# Patient Record
Sex: MALE | Race: ASIAN | Employment: OTHER | URBAN - METROPOLITAN AREA
[De-identification: names, ages, dates, MRNs, and addresses within clinical notes are randomized per-mention and may not be internally consistent; named-entity substitution may affect disease eponyms.]

---

## 2017-01-01 ENCOUNTER — APPOINTMENT (OUTPATIENT)
Dept: CT IMAGING | Facility: HOSPITAL | Age: 67
DRG: 811 | End: 2017-01-01
Attending: INTERNAL MEDICINE
Payer: MEDICARE

## 2017-01-01 ENCOUNTER — APPOINTMENT (OUTPATIENT)
Dept: GENERAL RADIOLOGY | Facility: HOSPITAL | Age: 67
DRG: 258 | End: 2017-01-01
Attending: INTERNAL MEDICINE
Payer: MEDICARE

## 2017-01-01 ENCOUNTER — TELEPHONE (OUTPATIENT)
Dept: MEDSURG UNIT | Facility: HOSPITAL | Age: 67
End: 2017-01-01

## 2017-01-01 ENCOUNTER — APPOINTMENT (OUTPATIENT)
Dept: GENERAL RADIOLOGY | Facility: HOSPITAL | Age: 67
DRG: 811 | End: 2017-01-01
Attending: INTERNAL MEDICINE
Payer: MEDICARE

## 2017-01-01 ENCOUNTER — SURGERY (OUTPATIENT)
Age: 67
End: 2017-01-01

## 2017-01-01 ENCOUNTER — APPOINTMENT (OUTPATIENT)
Dept: GENERAL RADIOLOGY | Facility: HOSPITAL | Age: 67
DRG: 811 | End: 2017-01-01
Attending: CLINICAL NURSE SPECIALIST
Payer: MEDICARE

## 2017-01-01 ENCOUNTER — APPOINTMENT (OUTPATIENT)
Dept: GENERAL RADIOLOGY | Facility: HOSPITAL | Age: 67
DRG: 258 | End: 2017-01-01
Attending: EMERGENCY MEDICINE
Payer: MEDICARE

## 2017-01-01 ENCOUNTER — HOSPITAL ENCOUNTER (EMERGENCY)
Facility: HOSPITAL | Age: 67
Discharge: OTHER INSTITUTION | End: 2017-01-01
Attending: EMERGENCY MEDICINE
Payer: MEDICARE

## 2017-01-01 ENCOUNTER — APPOINTMENT (OUTPATIENT)
Dept: CV DIAGNOSTICS | Facility: HOSPITAL | Age: 67
DRG: 258 | End: 2017-01-01
Attending: INTERNAL MEDICINE
Payer: MEDICARE

## 2017-01-01 ENCOUNTER — APPOINTMENT (OUTPATIENT)
Dept: INTERVENTIONAL RADIOLOGY/VASCULAR | Facility: HOSPITAL | Age: 67
DRG: 258 | End: 2017-01-01
Attending: INTERNAL MEDICINE
Payer: MEDICARE

## 2017-01-01 ENCOUNTER — TELEPHONE (OUTPATIENT)
Dept: PALLIATIVE CARE | Facility: HOSPITAL | Age: 67
End: 2017-01-01

## 2017-01-01 ENCOUNTER — HOSPITAL ENCOUNTER (INPATIENT)
Facility: HOSPITAL | Age: 67
LOS: 5 days | Discharge: NURSING, REHAB OR LONG-TERM CARE | DRG: 258 | End: 2017-01-01
Attending: EMERGENCY MEDICINE | Admitting: INTERNAL MEDICINE
Payer: MEDICARE

## 2017-01-01 ENCOUNTER — HOSPITAL ENCOUNTER (INPATIENT)
Facility: HOSPITAL | Age: 67
LOS: 15 days | Discharge: NURSING, REHAB OR LONG-TERM CARE | DRG: 811 | End: 2017-01-01
Attending: EMERGENCY MEDICINE | Admitting: INTERNAL MEDICINE
Payer: MEDICARE

## 2017-01-01 ENCOUNTER — APPOINTMENT (OUTPATIENT)
Dept: GENERAL RADIOLOGY | Facility: HOSPITAL | Age: 67
End: 2017-01-01
Attending: EMERGENCY MEDICINE
Payer: MEDICARE

## 2017-01-01 VITALS
HEART RATE: 69 BPM | TEMPERATURE: 98 F | BODY MASS INDEX: 20.84 KG/M2 | DIASTOLIC BLOOD PRESSURE: 64 MMHG | HEIGHT: 66 IN | SYSTOLIC BLOOD PRESSURE: 130 MMHG | OXYGEN SATURATION: 99 % | RESPIRATION RATE: 14 BRPM | WEIGHT: 129.69 LBS

## 2017-01-01 VITALS
HEART RATE: 92 BPM | OXYGEN SATURATION: 100 % | SYSTOLIC BLOOD PRESSURE: 95 MMHG | HEIGHT: 65 IN | WEIGHT: 128.5 LBS | TEMPERATURE: 100 F | BODY MASS INDEX: 21.41 KG/M2 | RESPIRATION RATE: 22 BRPM | DIASTOLIC BLOOD PRESSURE: 63 MMHG

## 2017-01-01 VITALS
HEART RATE: 82 BPM | DIASTOLIC BLOOD PRESSURE: 64 MMHG | SYSTOLIC BLOOD PRESSURE: 96 MMHG | OXYGEN SATURATION: 100 % | RESPIRATION RATE: 28 BRPM | TEMPERATURE: 98 F

## 2017-01-01 DIAGNOSIS — R78.81 POSITIVE BLOOD CULTURE: ICD-10-CM

## 2017-01-01 DIAGNOSIS — J96.10 CHRONIC RESPIRATORY FAILURE, UNSPECIFIED WHETHER WITH HYPOXIA OR HYPERCAPNIA (HCC): Primary | ICD-10-CM

## 2017-01-01 DIAGNOSIS — J81.0 ACUTE PULMONARY EDEMA (HCC): Primary | ICD-10-CM

## 2017-01-01 DIAGNOSIS — I69.30 HISTORY OF CVA WITH RESIDUAL DEFICIT: ICD-10-CM

## 2017-01-01 DIAGNOSIS — J18.9 NOSOCOMIAL PNEUMONIA: ICD-10-CM

## 2017-01-01 DIAGNOSIS — D64.9 ANEMIA, UNSPECIFIED TYPE: Primary | ICD-10-CM

## 2017-01-01 DIAGNOSIS — Z99.11 VENTILATOR DEPENDENT (HCC): ICD-10-CM
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
## 2017-01-01 LAB
ALBUMIN SERPL BCP-MCNC: 1.6 G/DL (ref 3.5–4.8)
ALBUMIN/GLOB SERPL: 0.3 {RATIO} (ref 1–2)
ALP SERPL-CCNC: 90 U/L (ref 32–100)
ALT SERPL-CCNC: 33 U/L (ref 17–63)
ANION GAP SERPL CALC-SCNC: 10 MMOL/L (ref 0–18)
ANION GAP SERPL CALC-SCNC: 10 MMOL/L (ref 0–18)
ANION GAP SERPL CALC-SCNC: 4 MMOL/L (ref 0–18)
ANION GAP SERPL CALC-SCNC: 7 MMOL/L (ref 0–18)
ANION GAP SERPL CALC-SCNC: 8 MMOL/L (ref 0–18)
ANION GAP SERPL CALC-SCNC: 9 MMOL/L (ref 0–18)
ANTIBODY SCREEN: NEGATIVE
AST SERPL-CCNC: 26 U/L (ref 15–41)
BASOPHILS # BLD: 0 K/UL (ref 0–0.2)
BASOPHILS # BLD: 0.1 K/UL (ref 0–0.2)
BASOPHILS # BLD: 0.1 K/UL (ref 0–0.2)
BASOPHILS NFR BLD: 0 %
BASOPHILS NFR BLD: 1 %
BASOPHILS NFR BLD: 1 %
BILIRUB SERPL-MCNC: 0.8 MG/DL (ref 0.3–1.2)
BILIRUB UR QL: NEGATIVE
BLOOD TYPE BARCODE: 7300
BUN SERPL-MCNC: 18 MG/DL (ref 8–20)
BUN SERPL-MCNC: 21 MG/DL (ref 8–20)
BUN SERPL-MCNC: 24 MG/DL (ref 8–20)
BUN SERPL-MCNC: 29 MG/DL (ref 8–20)
BUN SERPL-MCNC: 39 MG/DL (ref 8–20)
BUN SERPL-MCNC: 44 MG/DL (ref 8–20)
BUN/CREAT SERPL: 13.7 (ref 10–20)
BUN/CREAT SERPL: 14.4 (ref 10–20)
BUN/CREAT SERPL: 19 (ref 10–20)
BUN/CREAT SERPL: 19.1 (ref 10–20)
BUN/CREAT SERPL: 23.8 (ref 10–20)
BUN/CREAT SERPL: 28.4 (ref 10–20)
CALCIUM SERPL-MCNC: 7.5 MG/DL (ref 8.5–10.5)
CALCIUM SERPL-MCNC: 7.9 MG/DL (ref 8.5–10.5)
CALCIUM SERPL-MCNC: 8.1 MG/DL (ref 8.5–10.5)
CALCIUM SERPL-MCNC: 8.3 MG/DL (ref 8.5–10.5)
CHLORIDE SERPL-SCNC: 110 MMOL/L (ref 95–110)
CHLORIDE SERPL-SCNC: 110 MMOL/L (ref 95–110)
CHLORIDE SERPL-SCNC: 111 MMOL/L (ref 95–110)
CHLORIDE SERPL-SCNC: 113 MMOL/L (ref 95–110)
CHLORIDE SERPL-SCNC: 113 MMOL/L (ref 95–110)
CHLORIDE SERPL-SCNC: 115 MMOL/L (ref 95–110)
CHOLEST SERPL-MCNC: 65 MG/DL (ref 110–200)
CO2 SERPL-SCNC: 18 MMOL/L (ref 22–32)
CO2 SERPL-SCNC: 18 MMOL/L (ref 22–32)
CO2 SERPL-SCNC: 19 MMOL/L (ref 22–32)
CO2 SERPL-SCNC: 20 MMOL/L (ref 22–32)
CO2 SERPL-SCNC: 21 MMOL/L (ref 22–32)
CO2 SERPL-SCNC: 24 MMOL/L (ref 22–32)
COLOR UR: YELLOW
CREAT SERPL-MCNC: 1.25 MG/DL (ref 0.5–1.5)
CREAT SERPL-MCNC: 1.26 MG/DL (ref 0.5–1.5)
CREAT SERPL-MCNC: 1.52 MG/DL (ref 0.5–1.5)
CREAT SERPL-MCNC: 1.53 MG/DL (ref 0.5–1.5)
CREAT SERPL-MCNC: 1.55 MG/DL (ref 0.5–1.5)
CREAT SERPL-MCNC: 1.64 MG/DL (ref 0.5–1.5)
EOSINOPHIL # BLD: 0.2 K/UL (ref 0–0.7)
EOSINOPHIL # BLD: 1.2 K/UL (ref 0–0.7)
EOSINOPHIL # BLD: 1.3 K/UL (ref 0–0.7)
EOSINOPHIL # BLD: 1.5 K/UL (ref 0–0.7)
EOSINOPHIL # BLD: 1.7 K/UL (ref 0–0.7)
EOSINOPHIL NFR BLD: 10 %
EOSINOPHIL NFR BLD: 10 %
EOSINOPHIL NFR BLD: 2 %
EOSINOPHIL NFR BLD: 9 %
EOSINOPHIL NFR BLD: 9 %
ERYTHROCYTE [DISTWIDTH] IN BLOOD BY AUTOMATED COUNT: 19.9 % (ref 11–15)
ERYTHROCYTE [DISTWIDTH] IN BLOOD BY AUTOMATED COUNT: 20.1 % (ref 11–15)
ERYTHROCYTE [DISTWIDTH] IN BLOOD BY AUTOMATED COUNT: 20.2 % (ref 11–15)
ERYTHROCYTE [DISTWIDTH] IN BLOOD BY AUTOMATED COUNT: 20.5 % (ref 11–15)
ERYTHROCYTE [DISTWIDTH] IN BLOOD BY AUTOMATED COUNT: 20.7 % (ref 11–15)
GLOBULIN PLAS-MCNC: 5 G/DL (ref 2.5–3.7)
GLUCOSE BLDC GLUCOMTR-MCNC: 106 MG/DL (ref 70–99)
GLUCOSE BLDC GLUCOMTR-MCNC: 120 MG/DL (ref 70–99)
GLUCOSE BLDC GLUCOMTR-MCNC: 122 MG/DL (ref 70–99)
GLUCOSE BLDC GLUCOMTR-MCNC: 133 MG/DL (ref 70–99)
GLUCOSE BLDC GLUCOMTR-MCNC: 133 MG/DL (ref 70–99)
GLUCOSE BLDC GLUCOMTR-MCNC: 149 MG/DL (ref 70–99)
GLUCOSE BLDC GLUCOMTR-MCNC: 152 MG/DL (ref 70–99)
GLUCOSE BLDC GLUCOMTR-MCNC: 156 MG/DL (ref 70–99)
GLUCOSE BLDC GLUCOMTR-MCNC: 160 MG/DL (ref 70–99)
GLUCOSE BLDC GLUCOMTR-MCNC: 168 MG/DL (ref 70–99)
GLUCOSE BLDC GLUCOMTR-MCNC: 168 MG/DL (ref 70–99)
GLUCOSE BLDC GLUCOMTR-MCNC: 169 MG/DL (ref 70–99)
GLUCOSE BLDC GLUCOMTR-MCNC: 169 MG/DL (ref 70–99)
GLUCOSE BLDC GLUCOMTR-MCNC: 171 MG/DL (ref 70–99)
GLUCOSE BLDC GLUCOMTR-MCNC: 177 MG/DL (ref 70–99)
GLUCOSE BLDC GLUCOMTR-MCNC: 179 MG/DL (ref 70–99)
GLUCOSE BLDC GLUCOMTR-MCNC: 184 MG/DL (ref 70–99)
GLUCOSE BLDC GLUCOMTR-MCNC: 184 MG/DL (ref 70–99)
GLUCOSE BLDC GLUCOMTR-MCNC: 190 MG/DL (ref 70–99)
GLUCOSE BLDC GLUCOMTR-MCNC: 195 MG/DL (ref 70–99)
GLUCOSE BLDC GLUCOMTR-MCNC: 200 MG/DL (ref 70–99)
GLUCOSE BLDC GLUCOMTR-MCNC: 203 MG/DL (ref 70–99)
GLUCOSE BLDC GLUCOMTR-MCNC: 205 MG/DL (ref 70–99)
GLUCOSE BLDC GLUCOMTR-MCNC: 209 MG/DL (ref 70–99)
GLUCOSE BLDC GLUCOMTR-MCNC: 228 MG/DL (ref 70–99)
GLUCOSE BLDC GLUCOMTR-MCNC: 230 MG/DL (ref 70–99)
GLUCOSE BLDC GLUCOMTR-MCNC: 247 MG/DL (ref 70–99)
GLUCOSE BLDC GLUCOMTR-MCNC: 250 MG/DL (ref 70–99)
GLUCOSE BLDC GLUCOMTR-MCNC: 251 MG/DL (ref 70–99)
GLUCOSE BLDC GLUCOMTR-MCNC: 257 MG/DL (ref 70–99)
GLUCOSE BLDC GLUCOMTR-MCNC: 300 MG/DL (ref 70–99)
GLUCOSE BLDC GLUCOMTR-MCNC: 301 MG/DL (ref 70–99)
GLUCOSE BLDC GLUCOMTR-MCNC: 322 MG/DL (ref 70–99)
GLUCOSE BLDC GLUCOMTR-MCNC: 336 MG/DL (ref 70–99)
GLUCOSE BLDC GLUCOMTR-MCNC: 344 MG/DL (ref 70–99)
GLUCOSE BLDC GLUCOMTR-MCNC: 359 MG/DL (ref 70–99)
GLUCOSE BLDC GLUCOMTR-MCNC: 363 MG/DL (ref 70–99)
GLUCOSE BLDC GLUCOMTR-MCNC: 443 MG/DL (ref 70–99)
GLUCOSE BLDC GLUCOMTR-MCNC: 472 MG/DL (ref 70–99)
GLUCOSE BLDC GLUCOMTR-MCNC: 478 MG/DL (ref 70–99)
GLUCOSE BLDC GLUCOMTR-MCNC: 48 MG/DL (ref 70–99)
GLUCOSE BLDC GLUCOMTR-MCNC: 73 MG/DL (ref 70–99)
GLUCOSE BLDC GLUCOMTR-MCNC: 95 MG/DL (ref 70–99)
GLUCOSE SERPL-MCNC: 126 MG/DL (ref 70–99)
GLUCOSE SERPL-MCNC: 175 MG/DL (ref 70–99)
GLUCOSE SERPL-MCNC: 195 MG/DL (ref 70–99)
GLUCOSE SERPL-MCNC: 208 MG/DL (ref 70–99)
GLUCOSE SERPL-MCNC: 214 MG/DL (ref 70–99)
GLUCOSE SERPL-MCNC: 239 MG/DL (ref 70–99)
HBA1C MFR BLD: 6.3 % (ref 4–6)
HCT VFR BLD AUTO: 20.7 % (ref 41–52)
HCT VFR BLD AUTO: 24.2 % (ref 41–52)
HCT VFR BLD AUTO: 25 % (ref 41–52)
HCT VFR BLD AUTO: 27.6 % (ref 41–52)
HCT VFR BLD AUTO: 28.6 % (ref 41–52)
HDLC SERPL-MCNC: 18 MG/DL
HEMOCCULT STL QL: NEGATIVE
HEMOCCULT STL QL: NEGATIVE
HGB BLD-MCNC: 6.6 G/DL (ref 13.5–17.5)
HGB BLD-MCNC: 7.8 G/DL (ref 13.5–17.5)
HGB BLD-MCNC: 8.1 G/DL (ref 13.5–17.5)
HGB BLD-MCNC: 8.1 G/DL (ref 13.5–17.5)
HGB BLD-MCNC: 8.9 G/DL (ref 13.5–17.5)
HGB BLD-MCNC: 8.9 G/DL (ref 13.5–17.5)
KETONES UR-MCNC: NEGATIVE MG/DL
LACTATE SERPL-SCNC: 2.3 MMOL/L (ref 0.5–2.2)
LDLC SERPL CALC-MCNC: 28 MG/DL (ref 0–99)
LEUKOCYTE ESTERASE UR QL STRIP.AUTO: NEGATIVE
LYMPHOCYTES # BLD: 2.3 K/UL (ref 1–4)
LYMPHOCYTES # BLD: 2.5 K/UL (ref 1–4)
LYMPHOCYTES # BLD: 2.6 K/UL (ref 1–4)
LYMPHOCYTES # BLD: 3 K/UL (ref 1–4)
LYMPHOCYTES # BLD: 3.2 K/UL (ref 1–4)
LYMPHOCYTES NFR BLD: 17 %
LYMPHOCYTES NFR BLD: 18 %
LYMPHOCYTES NFR BLD: 21 %
MAGNESIUM SERPL-MCNC: 1.7 MG/DL (ref 1.8–2.5)
MAGNESIUM SERPL-MCNC: 1.7 MG/DL (ref 1.8–2.5)
MAGNESIUM SERPL-MCNC: 1.9 MG/DL (ref 1.8–2.5)
MAGNESIUM SERPL-MCNC: 2.1 MG/DL (ref 1.8–2.5)
MCH RBC QN AUTO: 24.3 PG (ref 27–32)
MCH RBC QN AUTO: 24.7 PG (ref 27–32)
MCH RBC QN AUTO: 25.9 PG (ref 27–32)
MCH RBC QN AUTO: 26 PG (ref 27–32)
MCH RBC QN AUTO: 26.2 PG (ref 27–32)
MCHC RBC AUTO-ENTMCNC: 31.3 G/DL (ref 32–37)
MCHC RBC AUTO-ENTMCNC: 31.7 G/DL (ref 32–37)
MCHC RBC AUTO-ENTMCNC: 32 G/DL (ref 32–37)
MCHC RBC AUTO-ENTMCNC: 32.3 G/DL (ref 32–37)
MCHC RBC AUTO-ENTMCNC: 32.4 G/DL (ref 32–37)
MCV RBC AUTO: 77.6 FL (ref 80–100)
MCV RBC AUTO: 78.1 FL (ref 80–100)
MCV RBC AUTO: 80.2 FL (ref 80–100)
MCV RBC AUTO: 80.9 FL (ref 80–100)
MCV RBC AUTO: 81.1 FL (ref 80–100)
MONOCYTES # BLD: 0.7 K/UL (ref 0–1)
MONOCYTES # BLD: 0.9 K/UL (ref 0–1)
MONOCYTES # BLD: 1 K/UL (ref 0–1)
MONOCYTES NFR BLD: 5 %
MONOCYTES NFR BLD: 6 %
MONOCYTES NFR BLD: 6 %
MONOCYTES NFR BLD: 7 %
MONOCYTES NFR BLD: 7 %
MRSA DNA SPEC QL NAA+PROBE: NEGATIVE
NEUTROPHILS # BLD AUTO: 10.8 K/UL (ref 1.8–7.7)
NEUTROPHILS # BLD AUTO: 11.7 K/UL (ref 1.8–7.7)
NEUTROPHILS # BLD AUTO: 8.5 K/UL (ref 1.8–7.7)
NEUTROPHILS # BLD AUTO: 9 K/UL (ref 1.8–7.7)
NEUTROPHILS # BLD AUTO: 9.4 K/UL (ref 1.8–7.7)
NEUTROPHILS NFR BLD: 66 %
NEUTROPHILS NFR BLD: 66 %
NEUTROPHILS NFR BLD: 67 %
NEUTROPHILS NFR BLD: 68 %
NEUTROPHILS NFR BLD: 71 %
NITRITE UR QL STRIP.AUTO: NEGATIVE
NONHDLC SERPL-MCNC: 47 MG/DL
OSMOLALITY UR CALC.SUM OF ELEC: 291 MOSM/KG (ref 275–295)
OSMOLALITY UR CALC.SUM OF ELEC: 299 MOSM/KG (ref 275–295)
OSMOLALITY UR CALC.SUM OF ELEC: 300 MOSM/KG (ref 275–295)
OSMOLALITY UR CALC.SUM OF ELEC: 301 MOSM/KG (ref 275–295)
OSMOLALITY UR CALC.SUM OF ELEC: 307 MOSM/KG (ref 275–295)
OSMOLALITY UR CALC.SUM OF ELEC: 307 MOSM/KG (ref 275–295)
PH UR: 7 [PH] (ref 5–8)
PHOSPHATE SERPL-MCNC: 3.4 MG/DL (ref 2.4–4.7)
PLATELET # BLD AUTO: 132 K/UL (ref 140–400)
PLATELET # BLD AUTO: 139 K/UL (ref 140–400)
PLATELET # BLD AUTO: 148 K/UL (ref 140–400)
PLATELET # BLD AUTO: 189 K/UL (ref 140–400)
PLATELET # BLD AUTO: 204 K/UL (ref 140–400)
PMV BLD AUTO: 8.4 FL (ref 7.4–10.3)
PMV BLD AUTO: 8.8 FL (ref 7.4–10.3)
PMV BLD AUTO: 9 FL (ref 7.4–10.3)
PMV BLD AUTO: 9.1 FL (ref 7.4–10.3)
PMV BLD AUTO: 9.1 FL (ref 7.4–10.3)
POTASSIUM SERPL-SCNC: 3.1 MMOL/L (ref 3.3–5.1)
POTASSIUM SERPL-SCNC: 3.2 MMOL/L (ref 3.3–5.1)
POTASSIUM SERPL-SCNC: 3.5 MMOL/L (ref 3.3–5.1)
POTASSIUM SERPL-SCNC: 3.5 MMOL/L (ref 3.3–5.1)
POTASSIUM SERPL-SCNC: 3.6 MMOL/L (ref 3.3–5.1)
POTASSIUM SERPL-SCNC: 3.6 MMOL/L (ref 3.3–5.1)
POTASSIUM SERPL-SCNC: 3.8 MMOL/L (ref 3.3–5.1)
POTASSIUM SERPL-SCNC: 3.9 MMOL/L (ref 3.3–5.1)
POTASSIUM SERPL-SCNC: 4 MMOL/L (ref 3.3–5.1)
PROT SERPL-MCNC: 6.6 G/DL (ref 5.9–8.4)
PROT UR-MCNC: 100 MG/DL
RBC # BLD AUTO: 2.65 M/UL (ref 4.5–5.9)
RBC # BLD AUTO: 3.01 M/UL (ref 4.5–5.9)
RBC # BLD AUTO: 3.09 M/UL (ref 4.5–5.9)
RBC # BLD AUTO: 3.4 M/UL (ref 4.5–5.9)
RBC # BLD AUTO: 3.68 M/UL (ref 4.5–5.9)
RBC #/AREA URNS AUTO: 5 /HPF
RBC #/AREA URNS AUTO: 7 /HPF
RH BLOOD TYPE: POSITIVE
SODIUM SERPL-SCNC: 137 MMOL/L (ref 136–144)
SODIUM SERPL-SCNC: 139 MMOL/L (ref 136–144)
SODIUM SERPL-SCNC: 140 MMOL/L (ref 136–144)
SODIUM SERPL-SCNC: 141 MMOL/L (ref 136–144)
SODIUM SERPL-SCNC: 141 MMOL/L (ref 136–144)
SODIUM SERPL-SCNC: 142 MMOL/L (ref 136–144)
SP GR UR STRIP: 1.01 (ref 1–1.03)
TRIGL SERPL-MCNC: 93 MG/DL (ref 1–149)
TROPONIN I SERPL-MCNC: 0.12 NG/ML (ref ?–0.03)
UROBILINOGEN UR STRIP-ACNC: <2
VANCOMYCIN SERPL-MCNC: 24.4 MCG/ML
VANCOMYCIN TROUGH SERPL-MCNC: 31.1 MCG/ML (ref 10–20)
VANCOMYCIN TROUGH SERPL-MCNC: 36.6 MCG/ML (ref 10–20)
VIT C UR-MCNC: 20 MG/DL
WBC # BLD AUTO: 12.7 K/UL (ref 4–11)
WBC # BLD AUTO: 12.8 K/UL (ref 4–11)
WBC # BLD AUTO: 14.1 K/UL (ref 4–11)
WBC # BLD AUTO: 16.4 K/UL (ref 4–11)
WBC # BLD AUTO: 17.4 K/UL (ref 4–11)
WBC #/AREA URNS AUTO: 1 /HPF
WBC #/AREA URNS AUTO: 4 /HPF

## 2017-01-01 PROCEDURE — 71010 XR CHEST AP PORTABLE  (CPT=71010): CPT | Performed by: EMERGENCY MEDICINE

## 2017-01-01 PROCEDURE — 99233 SBSQ HOSP IP/OBS HIGH 50: CPT | Performed by: INTERNAL MEDICINE

## 2017-01-01 PROCEDURE — 96361 HYDRATE IV INFUSION ADD-ON: CPT

## 2017-01-01 PROCEDURE — 99291 CRITICAL CARE FIRST HOUR: CPT

## 2017-01-01 PROCEDURE — 71010 XR CHEST AP PORTABLE  (CPT=71010): CPT | Performed by: CLINICAL NURSE SPECIALIST

## 2017-01-01 PROCEDURE — 0JH606Z INSERTION OF PACEMAKER, DUAL CHAMBER INTO CHEST SUBCUTANEOUS TISSUE AND FASCIA, OPEN APPROACH: ICD-10-PCS | Performed by: INTERNAL MEDICINE

## 2017-01-01 PROCEDURE — 74000 XR ABDOMEN (1 VIEW) (CPT=74000): CPT | Performed by: INTERNAL MEDICINE

## 2017-01-01 PROCEDURE — 83605 ASSAY OF LACTIC ACID: CPT | Performed by: EMERGENCY MEDICINE

## 2017-01-01 PROCEDURE — 71010 XR CHEST AP PORTABLE  (CPT=71010): CPT | Performed by: INTERNAL MEDICINE

## 2017-01-01 PROCEDURE — 5A1955Z RESPIRATORY VENTILATION, GREATER THAN 96 CONSECUTIVE HOURS: ICD-10-PCS | Performed by: INTERNAL MEDICINE

## 2017-01-01 PROCEDURE — 74176 CT ABD & PELVIS W/O CONTRAST: CPT | Performed by: INTERNAL MEDICINE

## 2017-01-01 PROCEDURE — 99291 CRITICAL CARE FIRST HOUR: CPT | Performed by: INTERNAL MEDICINE

## 2017-01-01 PROCEDURE — 94002 VENT MGMT INPAT INIT DAY: CPT

## 2017-01-01 PROCEDURE — 99232 SBSQ HOSP IP/OBS MODERATE 35: CPT | Performed by: NURSE PRACTITIONER

## 2017-01-01 PROCEDURE — 85025 COMPLETE CBC W/AUTO DIFF WBC: CPT | Performed by: EMERGENCY MEDICINE

## 2017-01-01 PROCEDURE — 0DJ08ZZ INSPECTION OF UPPER INTESTINAL TRACT, VIA NATURAL OR ARTIFICIAL OPENING ENDOSCOPIC: ICD-10-PCS | Performed by: INTERNAL MEDICINE

## 2017-01-01 PROCEDURE — 93325 DOPPLER ECHO COLOR FLOW MAPG: CPT | Performed by: INTERNAL MEDICINE

## 2017-01-01 PROCEDURE — 30233N1 TRANSFUSION OF NONAUTOLOGOUS RED BLOOD CELLS INTO PERIPHERAL VEIN, PERCUTANEOUS APPROACH: ICD-10-PCS | Performed by: INTERNAL MEDICINE

## 2017-01-01 PROCEDURE — 93320 DOPPLER ECHO COMPLETE: CPT | Performed by: INTERNAL MEDICINE

## 2017-01-01 PROCEDURE — 96374 THER/PROPH/DIAG INJ IV PUSH: CPT

## 2017-01-01 PROCEDURE — 99223 1ST HOSP IP/OBS HIGH 75: CPT | Performed by: INTERNAL MEDICINE

## 2017-01-01 PROCEDURE — 93010 ELECTROCARDIOGRAM REPORT: CPT | Performed by: EMERGENCY MEDICINE

## 2017-01-01 PROCEDURE — 93306 TTE W/DOPPLER COMPLETE: CPT | Performed by: INTERNAL MEDICINE

## 2017-01-01 PROCEDURE — 99222 1ST HOSP IP/OBS MODERATE 55: CPT | Performed by: NURSE PRACTITIONER

## 2017-01-01 PROCEDURE — 99221 1ST HOSP IP/OBS SF/LOW 40: CPT | Performed by: REGISTERED NURSE

## 2017-01-01 PROCEDURE — 0JPT0PZ REMOVAL OF CARDIAC RHYTHM RELATED DEVICE FROM TRUNK SUBCUTANEOUS TISSUE AND FASCIA, OPEN APPROACH: ICD-10-PCS | Performed by: INTERNAL MEDICINE

## 2017-01-01 PROCEDURE — 93005 ELECTROCARDIOGRAM TRACING: CPT

## 2017-01-01 PROCEDURE — 99222 1ST HOSP IP/OBS MODERATE 55: CPT | Performed by: INTERNAL MEDICINE

## 2017-01-01 RX ORDER — FERROUS SULFATE 300 MG/5ML
220 LIQUID (ML) ORAL DAILY
COMMUNITY

## 2017-01-01 RX ORDER — LORAZEPAM 1 MG/1
1 TABLET ORAL EVERY 6 HOURS PRN
Status: DISCONTINUED | OUTPATIENT
Start: 2017-01-01 | End: 2017-01-01

## 2017-01-01 RX ORDER — DEXTROSE AND SODIUM CHLORIDE 5; .45 G/100ML; G/100ML
INJECTION, SOLUTION INTRAVENOUS
Status: DISPENSED
Start: 2017-01-01 | End: 2017-01-01

## 2017-01-01 RX ORDER — IPRATROPIUM BROMIDE AND ALBUTEROL SULFATE 2.5; .5 MG/3ML; MG/3ML
3 SOLUTION RESPIRATORY (INHALATION)
Status: DISCONTINUED | OUTPATIENT
Start: 2017-01-01 | End: 2017-01-01

## 2017-01-01 RX ORDER — DEXTROSE MONOHYDRATE 100 MG/ML
INJECTION, SOLUTION INTRAVENOUS
Status: COMPLETED
Start: 2017-01-01 | End: 2017-01-01

## 2017-01-01 RX ORDER — SODIUM CHLORIDE 9 MG/ML
INJECTION, SOLUTION INTRAVENOUS
Status: COMPLETED
Start: 2017-01-01 | End: 2017-01-01

## 2017-01-01 RX ORDER — DEXTROSE MONOHYDRATE 25 G/50ML
50 INJECTION, SOLUTION INTRAVENOUS AS NEEDED
Status: DISCONTINUED | OUTPATIENT
Start: 2017-01-01 | End: 2017-01-01

## 2017-01-01 RX ORDER — SODIUM CHLORIDE 9 MG/ML
INJECTION, SOLUTION INTRAVENOUS CONTINUOUS
Status: DISCONTINUED | OUTPATIENT
Start: 2017-01-01 | End: 2017-01-01

## 2017-01-01 RX ORDER — METOPROLOL TARTRATE 5 MG/5ML
5 INJECTION INTRAVENOUS EVERY 6 HOURS PRN
Status: DISCONTINUED | OUTPATIENT
Start: 2017-01-01 | End: 2017-01-01

## 2017-01-01 RX ORDER — BISACODYL 10 MG
10 SUPPOSITORY, RECTAL RECTAL DAILY
COMMUNITY

## 2017-01-01 RX ORDER — ACETAMINOPHEN 325 MG/1
650 TABLET ORAL ONCE
Status: COMPLETED | OUTPATIENT
Start: 2017-01-01 | End: 2017-01-01

## 2017-01-01 RX ORDER — NITROGLYCERIN 0.4 MG/1
0.4 TABLET SUBLINGUAL EVERY 5 MIN PRN
Status: DISCONTINUED | OUTPATIENT
Start: 2017-01-01 | End: 2017-01-01

## 2017-01-01 RX ORDER — LORAZEPAM 2 MG/ML
2 INJECTION INTRAMUSCULAR ONCE
Status: COMPLETED | OUTPATIENT
Start: 2017-01-01 | End: 2017-01-01

## 2017-01-01 RX ORDER — DEXTROSE AND SODIUM CHLORIDE 5; .9 G/100ML; G/100ML
INJECTION, SOLUTION INTRAVENOUS CONTINUOUS
Status: DISCONTINUED | OUTPATIENT
Start: 2017-01-01 | End: 2017-01-01

## 2017-01-01 RX ORDER — BISACODYL 10 MG
10 SUPPOSITORY, RECTAL RECTAL
Status: DISCONTINUED | OUTPATIENT
Start: 2017-01-01 | End: 2017-01-01

## 2017-01-01 RX ORDER — 0.9 % SODIUM CHLORIDE 0.9 %
VIAL (ML) INJECTION
Status: COMPLETED
Start: 2017-01-01 | End: 2017-01-01

## 2017-01-01 RX ORDER — CARVEDILOL 3.12 MG/1
3.12 TABLET ORAL 2 TIMES DAILY WITH MEALS
Qty: 120 TABLET | Refills: 0 | Status: SHIPPED | OUTPATIENT
Start: 2017-01-01 | End: 2017-01-01

## 2017-01-01 RX ORDER — LISINOPRIL 2.5 MG/1
2.5 TABLET ORAL DAILY
COMMUNITY

## 2017-01-01 RX ORDER — 0.9 % SODIUM CHLORIDE 0.9 %
VIAL (ML) INJECTION
Status: DISCONTINUED
Start: 2017-01-01 | End: 2017-01-01

## 2017-01-01 RX ORDER — SODIUM CHLORIDE 9 MG/ML
INJECTION, SOLUTION INTRAVENOUS ONCE
Status: DISCONTINUED | OUTPATIENT
Start: 2017-01-01 | End: 2017-01-01

## 2017-01-01 RX ORDER — MAGNESIUM OXIDE 400 MG (241.3 MG MAGNESIUM) TABLET
400 TABLET ONCE
Status: COMPLETED | OUTPATIENT
Start: 2017-01-01 | End: 2017-01-01

## 2017-01-01 RX ORDER — TERAZOSIN 5 MG/1
5 CAPSULE ORAL NIGHTLY
Status: DISCONTINUED | OUTPATIENT
Start: 2017-01-01 | End: 2017-01-01

## 2017-01-01 RX ORDER — ONDANSETRON HYDROCHLORIDE 4 MG/5ML
4 SOLUTION ORAL ONCE
Status: DISCONTINUED | OUTPATIENT
Start: 2017-01-01 | End: 2017-01-01 | Stop reason: RX

## 2017-01-01 RX ORDER — ACETAMINOPHEN 325 MG/1
325 TABLET ORAL EVERY 6 HOURS PRN
Status: DISCONTINUED | OUTPATIENT
Start: 2017-01-01 | End: 2017-01-01

## 2017-01-01 RX ORDER — GARLIC EXTRACT 500 MG
1 CAPSULE ORAL 2 TIMES DAILY
COMMUNITY

## 2017-01-01 RX ORDER — CARVEDILOL 3.12 MG/1
3.12 TABLET ORAL 2 TIMES DAILY WITH MEALS
Status: DISCONTINUED | OUTPATIENT
Start: 2017-01-01 | End: 2017-01-01

## 2017-01-01 RX ORDER — METOCLOPRAMIDE 10 MG/1
5 TABLET ORAL
Status: DISCONTINUED | OUTPATIENT
Start: 2017-01-01 | End: 2017-01-01

## 2017-01-01 RX ORDER — BISACODYL 10 MG
10 SUPPOSITORY, RECTAL RECTAL DAILY
Status: DISCONTINUED | OUTPATIENT
Start: 2017-01-01 | End: 2017-01-01

## 2017-01-01 RX ORDER — AMANTADINE HYDROCHLORIDE 50 MG/5ML
50 SOLUTION ORAL EVERY 12 HOURS
COMMUNITY

## 2017-01-01 RX ORDER — METOCLOPRAMIDE HYDROCHLORIDE 5 MG/ML
10 INJECTION INTRAMUSCULAR; INTRAVENOUS EVERY 8 HOURS
Status: DISCONTINUED | OUTPATIENT
Start: 2017-01-01 | End: 2017-01-01

## 2017-01-01 RX ORDER — POTASSIUM CHLORIDE 14.9 MG/ML
20 INJECTION INTRAVENOUS ONCE
Status: DISCONTINUED | OUTPATIENT
Start: 2017-01-01 | End: 2017-01-01

## 2017-01-01 RX ORDER — CARVEDILOL 6.25 MG/1
6.25 TABLET ORAL 2 TIMES DAILY WITH MEALS
COMMUNITY
End: 2017-01-01

## 2017-01-01 RX ORDER — METOCLOPRAMIDE 10 MG/1
10 TABLET ORAL
Status: DISCONTINUED | OUTPATIENT
Start: 2017-01-01 | End: 2017-01-01

## 2017-01-01 RX ORDER — ARIPIPRAZOLE 15 MG/1
40 TABLET ORAL EVERY 4 HOURS
Status: DISCONTINUED | OUTPATIENT
Start: 2017-01-01 | End: 2017-01-01

## 2017-01-01 RX ORDER — METOCLOPRAMIDE 10 MG/1
10 TABLET ORAL
COMMUNITY

## 2017-01-01 RX ORDER — CARVEDILOL 6.25 MG/1
6.25 TABLET ORAL 2 TIMES DAILY WITH MEALS
Status: DISCONTINUED | OUTPATIENT
Start: 2017-01-01 | End: 2017-01-01

## 2017-01-01 RX ORDER — POTASSIUM CHLORIDE 29.8 MG/ML
40 INJECTION INTRAVENOUS ONCE
Status: DISCONTINUED | OUTPATIENT
Start: 2017-01-01 | End: 2017-01-01

## 2017-01-01 RX ORDER — ATORVASTATIN CALCIUM 20 MG/1
20 TABLET, FILM COATED ORAL NIGHTLY
COMMUNITY

## 2017-01-01 RX ORDER — ZINC SULFATE 50(220)MG
220 CAPSULE ORAL DAILY
COMMUNITY

## 2017-01-01 RX ORDER — POTASSIUM CHLORIDE 29.8 MG/ML
40 INJECTION INTRAVENOUS ONCE
Status: COMPLETED | OUTPATIENT
Start: 2017-01-01 | End: 2017-01-01

## 2017-01-01 RX ORDER — DIPHENHYDRAMINE HYDROCHLORIDE 50 MG/ML
25 INJECTION INTRAMUSCULAR; INTRAVENOUS ONCE
Status: COMPLETED | OUTPATIENT
Start: 2017-01-01 | End: 2017-01-01

## 2017-01-01 RX ORDER — POTASSIUM CHLORIDE 14.9 MG/ML
20 INJECTION INTRAVENOUS ONCE
Status: COMPLETED | OUTPATIENT
Start: 2017-01-01 | End: 2017-01-01

## 2017-01-01 RX ORDER — LISINOPRIL 2.5 MG/1
2.5 TABLET ORAL DAILY
Status: DISCONTINUED | OUTPATIENT
Start: 2017-01-01 | End: 2017-01-01

## 2017-01-01 RX ORDER — ONDANSETRON 2 MG/ML
4 INJECTION INTRAMUSCULAR; INTRAVENOUS EVERY 6 HOURS PRN
Status: DISCONTINUED | OUTPATIENT
Start: 2017-01-01 | End: 2017-01-01

## 2017-01-01 RX ORDER — LISINOPRIL 5 MG/1
2.5 TABLET ORAL DAILY
Status: DISCONTINUED | OUTPATIENT
Start: 2017-01-01 | End: 2017-01-01

## 2017-01-01 RX ORDER — POLYETHYLENE GLYCOL 3350 17 G/17G
17 POWDER, FOR SOLUTION ORAL DAILY PRN
Status: DISCONTINUED | OUTPATIENT
Start: 2017-01-01 | End: 2017-01-01

## 2017-01-01 RX ORDER — SODIUM CHLORIDE 0.9 % (FLUSH) 0.9 %
10 SYRINGE (ML) INJECTION AS NEEDED
Status: DISCONTINUED | OUTPATIENT
Start: 2017-01-01 | End: 2017-01-01

## 2017-01-01 RX ORDER — MIDAZOLAM HYDROCHLORIDE 1 MG/ML
2 INJECTION INTRAMUSCULAR; INTRAVENOUS ONCE
Status: COMPLETED | OUTPATIENT
Start: 2017-01-01 | End: 2017-01-01

## 2017-01-01 RX ORDER — SODIUM CHLORIDE 9 MG/ML
INJECTION, SOLUTION INTRAVENOUS ONCE
Status: COMPLETED | OUTPATIENT
Start: 2017-01-01 | End: 2017-01-01

## 2017-01-01 RX ORDER — LORAZEPAM 1 MG/1
1 TABLET ORAL EVERY 6 HOURS PRN
COMMUNITY

## 2017-01-01 RX ORDER — BACITRACIN 50000 [USP'U]/1
INJECTION, POWDER, LYOPHILIZED, FOR SOLUTION INTRAMUSCULAR
Status: COMPLETED
Start: 2017-01-01 | End: 2017-01-01

## 2017-01-01 RX ORDER — FAMOTIDINE 20 MG/1
20 TABLET ORAL 2 TIMES DAILY
COMMUNITY

## 2017-01-01 RX ORDER — ASPIRIN 81 MG/1
81 TABLET, CHEWABLE ORAL DAILY
Status: DISCONTINUED | OUTPATIENT
Start: 2017-01-01 | End: 2017-01-01

## 2017-01-01 RX ORDER — ONDANSETRON HYDROCHLORIDE 4 MG/5ML
4 SOLUTION ORAL ONCE
COMMUNITY

## 2017-01-01 RX ORDER — DEXTROSE, SODIUM CHLORIDE, AND POTASSIUM CHLORIDE 5; .45; .15 G/100ML; G/100ML; G/100ML
INJECTION INTRAVENOUS CONTINUOUS
Status: DISCONTINUED | OUTPATIENT
Start: 2017-01-01 | End: 2017-01-01

## 2017-01-01 RX ORDER — 0.9 % SODIUM CHLORIDE 0.9 %
VIAL (ML) INJECTION
Status: DISPENSED
Start: 2017-01-01 | End: 2017-01-01

## 2017-01-01 RX ORDER — ARIPIPRAZOLE 15 MG/1
40 TABLET ORAL ONCE
Status: COMPLETED | OUTPATIENT
Start: 2017-01-01 | End: 2017-01-01

## 2017-01-01 RX ORDER — AMANTADINE HYDROCHLORIDE 50 MG/5ML
50 SOLUTION ORAL EVERY 12 HOURS
Status: DISCONTINUED | OUTPATIENT
Start: 2017-01-01 | End: 2017-01-01

## 2017-01-01 RX ORDER — CASTOR OIL AND BALSAM, PERU 788; 87 MG/G; MG/G
OINTMENT TOPICAL 2 TIMES DAILY PRN
Status: DISCONTINUED | OUTPATIENT
Start: 2017-01-01 | End: 2017-01-01

## 2017-01-01 RX ORDER — ARIPIPRAZOLE 15 MG/1
40 TABLET ORAL EVERY 4 HOURS
Status: COMPLETED | OUTPATIENT
Start: 2017-01-01 | End: 2017-01-01

## 2017-01-01 RX ORDER — CASTOR OIL AND BALSAM, PERU 788; 87 MG/G; MG/G
OINTMENT TOPICAL AS NEEDED
Status: DISCONTINUED | OUTPATIENT
Start: 2017-01-01 | End: 2017-01-01

## 2017-01-01 RX ORDER — FUROSEMIDE 10 MG/ML
40 INJECTION INTRAMUSCULAR; INTRAVENOUS ONCE
Status: COMPLETED | OUTPATIENT
Start: 2017-01-01 | End: 2017-01-01

## 2017-01-01 RX ORDER — POTASSIUM CHLORIDE 20 MEQ/1
40 TABLET, EXTENDED RELEASE ORAL ONCE
Status: COMPLETED | OUTPATIENT
Start: 2017-01-01 | End: 2017-01-01

## 2017-01-01 RX ORDER — BISACODYL 10 MG
10 SUPPOSITORY, RECTAL RECTAL ONCE
Status: COMPLETED | OUTPATIENT
Start: 2017-01-01 | End: 2017-01-01

## 2017-01-01 RX ORDER — FUROSEMIDE 10 MG/ML
20 INJECTION INTRAMUSCULAR; INTRAVENOUS
Status: DISCONTINUED | OUTPATIENT
Start: 2017-01-01 | End: 2017-01-01

## 2017-01-01 RX ORDER — ACETAMINOPHEN 325 MG/1
325 TABLET ORAL EVERY 6 HOURS PRN
COMMUNITY

## 2017-01-01 RX ORDER — MULTIVITAMIN
5 TABLET ORAL
COMMUNITY

## 2017-01-01 RX ORDER — ASCORBIC ACID 500 MG
500 TABLET ORAL DAILY
COMMUNITY

## 2017-01-01 RX ORDER — LEVOTHYROXINE SODIUM 0.1 MG/1
100 TABLET ORAL
Status: DISCONTINUED | OUTPATIENT
Start: 2017-01-01 | End: 2017-01-01

## 2017-01-01 RX ORDER — MINERAL OIL AND PETROLATUM 150; 830 MG/G; MG/G
OINTMENT OPHTHALMIC 3 TIMES DAILY
Status: DISCONTINUED | OUTPATIENT
Start: 2017-01-01 | End: 2017-01-01

## 2017-01-01 RX ORDER — SODIUM CHLORIDE 9 MG/ML
INJECTION, SOLUTION INTRAVENOUS
Status: DISPENSED
Start: 2017-01-01 | End: 2017-01-01

## 2017-01-01 RX ORDER — IPRATROPIUM BROMIDE AND ALBUTEROL SULFATE 2.5; .5 MG/3ML; MG/3ML
3 SOLUTION RESPIRATORY (INHALATION) EVERY 4 HOURS PRN
Status: DISCONTINUED | OUTPATIENT
Start: 2017-01-01 | End: 2017-01-01

## 2017-01-01 RX ORDER — FAMOTIDINE 20 MG/1
20 TABLET ORAL 2 TIMES DAILY
Status: DISCONTINUED | OUTPATIENT
Start: 2017-01-01 | End: 2017-01-01

## 2017-01-01 RX ORDER — DOXAZOSIN 8 MG/1
10 TABLET ORAL NIGHTLY
COMMUNITY

## 2017-01-01 RX ORDER — HEPARIN SODIUM 5000 [USP'U]/ML
5000 INJECTION, SOLUTION INTRAVENOUS; SUBCUTANEOUS EVERY 8 HOURS SCHEDULED
Status: DISCONTINUED | OUTPATIENT
Start: 2017-01-01 | End: 2017-01-01

## 2017-01-01 RX ORDER — ONDANSETRON 4 MG/1
4 TABLET, ORALLY DISINTEGRATING ORAL ONCE
Status: DISCONTINUED | OUTPATIENT
Start: 2017-01-01 | End: 2017-01-01

## 2017-01-01 RX ORDER — CASTOR OIL AND BALSAM, PERU 788; 87 MG/G; MG/G
OINTMENT TOPICAL 2 TIMES DAILY
Status: DISCONTINUED | OUTPATIENT
Start: 2017-01-01 | End: 2017-01-01

## 2017-01-01 RX ORDER — SENNOSIDES 8.6 MG
8.6 TABLET ORAL 2 TIMES DAILY
COMMUNITY
End: 2017-01-01

## 2017-01-01 RX ORDER — DEXTROSE AND SODIUM CHLORIDE 5; .45 G/100ML; G/100ML
INJECTION, SOLUTION INTRAVENOUS
Status: COMPLETED
Start: 2017-01-01 | End: 2017-01-01

## 2017-01-01 RX ORDER — HEPARIN SODIUM 5000 [USP'U]/ML
5000 INJECTION, SOLUTION INTRAVENOUS; SUBCUTANEOUS EVERY 8 HOURS SCHEDULED
COMMUNITY

## 2017-01-01 RX ORDER — IPRATROPIUM BROMIDE AND ALBUTEROL SULFATE 2.5; .5 MG/3ML; MG/3ML
3 SOLUTION RESPIRATORY (INHALATION) 2 TIMES DAILY
COMMUNITY

## 2017-01-01 RX ORDER — DOCUSATE SODIUM 100 MG/1
100 CAPSULE, LIQUID FILLED ORAL 2 TIMES DAILY
COMMUNITY

## 2017-01-01 RX ORDER — SODIUM PHOSPHATE, DIBASIC AND SODIUM PHOSPHATE, MONOBASIC 7; 19 G/133ML; G/133ML
1 ENEMA RECTAL ONCE AS NEEDED
Status: DISCONTINUED | OUTPATIENT
Start: 2017-01-01 | End: 2017-01-01

## 2017-01-01 RX ORDER — ATORVASTATIN CALCIUM 20 MG/1
20 TABLET, FILM COATED ORAL NIGHTLY
Status: DISCONTINUED | OUTPATIENT
Start: 2017-01-01 | End: 2017-01-01

## 2017-01-01 RX ORDER — DEXTROSE MONOHYDRATE 100 MG/ML
INJECTION, SOLUTION INTRAVENOUS CONTINUOUS
Status: DISCONTINUED | OUTPATIENT
Start: 2017-01-01 | End: 2017-01-01

## 2017-01-01 RX ORDER — FUROSEMIDE 10 MG/ML
10 INJECTION INTRAMUSCULAR; INTRAVENOUS ONCE
Status: COMPLETED | OUTPATIENT
Start: 2017-01-01 | End: 2017-01-01

## 2017-01-01 RX ORDER — MIDAZOLAM HYDROCHLORIDE 1 MG/ML
INJECTION INTRAMUSCULAR; INTRAVENOUS
Status: COMPLETED
Start: 2017-01-01 | End: 2017-01-01

## 2017-01-01 RX ORDER — FUROSEMIDE 10 MG/ML
60 INJECTION INTRAMUSCULAR; INTRAVENOUS
Status: DISCONTINUED | OUTPATIENT
Start: 2017-01-01 | End: 2017-01-01

## 2017-01-01 RX ORDER — LEVOTHYROXINE SODIUM 0.1 MG/1
100 TABLET ORAL
COMMUNITY

## 2017-01-01 RX ORDER — ONDANSETRON 4 MG/1
4 TABLET, FILM COATED ORAL ONCE
Status: DISCONTINUED | OUTPATIENT
Start: 2017-01-01 | End: 2017-01-01

## 2017-01-01 RX ORDER — FUROSEMIDE 10 MG/ML
40 INJECTION INTRAMUSCULAR; INTRAVENOUS
Status: DISCONTINUED | OUTPATIENT
Start: 2017-01-01 | End: 2017-01-01

## 2017-01-01 RX ORDER — LEVOFLOXACIN 500 MG/1
500 TABLET, FILM COATED ORAL DAILY
COMMUNITY
End: 2017-01-01

## 2017-01-01 RX ORDER — MIDAZOLAM HYDROCHLORIDE 1 MG/ML
INJECTION INTRAMUSCULAR; INTRAVENOUS
Status: DISCONTINUED | OUTPATIENT
Start: 2017-01-01 | End: 2017-01-01

## 2017-01-01 RX ORDER — MORPHINE SULFATE 2 MG/ML
2 INJECTION, SOLUTION INTRAMUSCULAR; INTRAVENOUS ONCE
Status: COMPLETED | OUTPATIENT
Start: 2017-01-01 | End: 2017-01-01

## 2017-01-01 RX ORDER — CARVEDILOL 3.12 MG/1
3.12 TABLET ORAL 2 TIMES DAILY WITH MEALS
Qty: 120 TABLET | Refills: 0 | Status: SHIPPED | OUTPATIENT
Start: 2017-01-01

## 2017-01-01 RX ORDER — METOCLOPRAMIDE HYDROCHLORIDE 5 MG/ML
10 INJECTION INTRAMUSCULAR; INTRAVENOUS EVERY 8 HOURS PRN
Status: DISCONTINUED | OUTPATIENT
Start: 2017-01-01 | End: 2017-01-01

## 2017-01-01 RX ORDER — MAGNESIUM SULFATE HEPTAHYDRATE 40 MG/ML
2 INJECTION, SOLUTION INTRAVENOUS ONCE
Status: COMPLETED | OUTPATIENT
Start: 2017-01-01 | End: 2017-01-01

## 2017-09-16 PROBLEM — I69.30 HISTORY OF CVA WITH RESIDUAL DEFICIT: Status: ACTIVE | Noted: 2017-01-01

## 2017-09-16 PROBLEM — J81.0 ACUTE PULMONARY EDEMA (HCC): Status: ACTIVE | Noted: 2017-01-01

## 2017-09-16 PROBLEM — Z99.11 VENTILATOR DEPENDENT (HCC): Status: ACTIVE | Noted: 2017-01-01
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
## 2017-09-16 PROBLEM — J18.9 NOSOCOMIAL PNEUMONIA: Status: ACTIVE | Noted: 2017-01-01

## 2017-09-16 PROBLEM — R78.81 POSITIVE BLOOD CULTURE: Status: ACTIVE | Noted: 2017-01-01

## 2017-09-16 PROBLEM — J96.11 CHRONIC RESPIRATORY FAILURE WITH HYPOXIA (HCC): Status: ACTIVE | Noted: 2017-01-01

## 2017-09-16 NOTE — DIETARY NOTE
ADULT NUTRITION INITIAL ASSESSMENT    Pt is at high nutrition risk. Pt does not meet malnutrition criteria. RECOMMENDATIONS TO MD:  RD ordered TF per protocol. See Nutrition Intervention for specifics.     Recommend additional Basal (Levemir) insulin FOOD/NUTRITION RELATED HISTORY:    Pt receiving TF Glucerna at [facility] facility but specifics not indicated in transfer record. Pt also receiving NPH insulin 32 units q 12 hrs PTA.   Intake Meeting Needs: Yes via TF  Food Allergies: NKFA     MEDICATIONS:

## 2017-09-16 NOTE — ED NOTES
Spoke with RN at [facility] to gather more pt information - pt has been a resident at [facility][1] in [place] since 09/26/17.  He suffered from a CVA on 07/14/17, was inpatient at [facility] for some time then transferred to [facility] before settling in at

## 2017-09-16 NOTE — PROGRESS NOTES
RESPIRATORY THERAPY MECHANICAL VENTILATION PROGRESS NOTE    Ventilator Weaning:  Patient meets criteria for weaning? no Weaning was attempted no   Current Ventilator Data:        Therapist recommendations:   P recommends      09/16/17 0722   Readings   T

## 2017-09-16 NOTE — H&P
[facility]    History and Physical    Koby Bryan Patient Status:  Inpatient    1950 MRN [id number]   Location [facility] 2W/SW Attending Reza Cantu MD   Hosp Day # 0 PCP Dina Byrd MD     Date:  2017  Da (twelve) hours. acetaminophen 325 MG Oral Tab Take 325 mg by mouth every 6 (six) hours as needed for Pain.   levofloxacin 500 MG Oral Tab Take 500 mg by mouth daily. Acidophilus/Pectin Oral Cap Take 1 capsule by mouth 2 (two) times daily.    LORazepam 1 pulse 70, temperature 98 °F (36.7 °C), temperature source Temporal, resp. rate 26, height 5' 6\" (1.676 m), weight 160 lb (72.6 kg), SpO2 100 %. Nursing note and vitals reviewed. Constitutional: He is thin.    Eyes:   Rt eye cataract      Neck:   trach palliative care                  Román Peguero MD  9/16/2017

## 2017-09-16 NOTE — ED PROVIDER NOTES
Patient Seen in: [facility] AND [facility] Emergency Department    History   Patient presents with:  Abnormal Result (metabolic, cardiac)    Stated Complaint: positive blood cultures    HPI    Is a 73-year-old vent dependent male from who arrives from [facility] [place] Result Value    Clarity Urine Hazy (*)     Protein Urine 100  (*)     Glucose Urine Trace (*)     Blood Urine Trace (*)     Ascorbic Acid Urine 20  (*)     RBC URINE 7 (*)     Bacteria Urine Few (*)     All other components within normal limits   BAS VIEW  9/15/2017 2243 hrs. IMPRESSION:    Patchy air space opacities in the lower lungs, left greater than right, suspicious for pneumonia/aspiration in the appropriate clinical setting.   There are also vague opacities in both lung suggesting there may edema ([facility] J81.0 9/16/2017 Unknown

## 2017-09-16 NOTE — PROGRESS NOTES
Night MD - CCU Admission    Impressions:  ---Bacteremia, with 2 of 2 blood cultures sent to [facility] on 9/14 PM reportedly grew Gram positive cocci in clusters (report sent with patient)  ---Had been on ertapenem and levofloxacin at [facility]) for UTI noted was that tip of PICC line appears superimposed over RA, with suggestion to pull back PICC line by ~ 7 cm. Given + blood cultures, may need to consider removing PICC line, vs. pulling it back.   D/W'd nurse who will pass along to AM shift to address t Q D   --Pepcid 20 mg daily  --heparin 5000 Q 8 S/Q  --Dulcolax suppository Q HS NH PRN  --Coreg 6.25 BID  --Colace 100 BID  --triamcinolone ointment to trach site BID  --amantadine syrup 150 mg BID (reportedly for seizure prevention)  --MOM 30 ml PRN daily 10:28 PM   Result Value Ref Range   Glucose 214 (H) 70 - 99 mg/dL   Sodium 140 136 - 144 mmol/L   Potassium 4.0 3.3 - 5.1 mmol/L   Chloride 110 95 - 110 mmol/L   CO2 20 (L) 22 - 32 mmol/L   BUN 24 (H) 8 - 20 mg/dL   Creatinine 1.26 0.50 - 1.50 mg/dL   Calc Collection Time: 09/16/17 12:20 AM   Result Value Ref Range   Lactic Acid 2.3 (H) 0.5 - 2.2 mmol/L   -POCT GLUCOSE   Collection Time: 09/16/17  1:49 AM   Result Value Ref Range   POC Glucose  73 70 - 99

## 2017-09-16 NOTE — CONSULTS
Cardiology Consult Note     PRIMARY CARDIOLOGIST: WALES      CONSULT FOR: HX OF PACER THAT IS NEAR END OF LIFE      HISTORY: 73 Y/O MALE WITH HX OF PACER AND HTN, RECENT HX OF LG CVA AND NOW IS UNRESPONSIVE ON VENT SINCE July.  WENT FOR PACER EVAL TODAY AND P

## 2017-09-16 NOTE — PROGRESS NOTES
[facility] dosing service    Initial Pharmacokinetic Consult for Vancomycin Dosing     Ann-Marie Soni is a 74year old male admitted who is being treated for bacteremia. and UTI  Pharmacy has been asked to dose Vancomycin by Dr. Patel    He has No Kno

## 2017-09-16 NOTE — CONSULTS
INFECTIOUS DISEASE CONSULT NOTE    Honorio Sommer Patient Status:  Inpatient    1950 MRN [id number]   Location [facility] 2W/SW Attending Giovanna Keenan MD   Hosp Day # 0 PCP Rita Mckeon Medications:   •  Amantadine HCl (SYMMETREL) syrup 50 mg, 50 mg, Oral, Q12H  •  acetaminophen (TYLENOL) tab 325 mg, 325 mg, Oral, Q6H PRN  •  aspirin chewable tab 81 mg, 81 mg, Oral, Daily  •  atorvastatin (LIPITOR) tab 20 mg, 20 mg, Oral, Nightly  •  carv PICC     Laboratory Data:    Recent Labs   Lab  09/15/17   2228   RBC  3.68*   HGB  8.9*   HCT  28.6*   MCV  77.6*   MCH  24.3*   MCHC  31.3*   RDW  20.2*   WBC  12.8*   PLT  139*     Recent Labs   Lab  09/15/17   2228   GLU  214*   BUN  24*   CREATSERUM care of this patient. Please do not hesitate to call if you have any questions. I will continue to follow with you and will make further recommendations based on his progress.     Daniel Nagy  9/16/2017

## 2017-09-16 NOTE — PROGRESS NOTES
PICC dual lumen power with clamp on the Rt upper arm evaluated, pulled back and there is now a 7 cm catheter on the exit site as recommended by Radiologist, Portable xray ordered to confirm placement

## 2017-09-16 NOTE — PLAN OF CARE
Problem: ALTERED NUTRIENT INTAKE - ADULT  Goal: Nutrient intake appropriate for improving, restoring or maintaining nutritional needs  INTERVENTIONS:  - Assess nutritional status and recommend course of action  - Monitor labs, and treatment plans  - Recomm

## 2017-09-16 NOTE — H&P
Pulmonary/Critical Care Admission Note    HPI:   Esther Marcos is a 74year old male with Patient presents with:  Abnormal Result (metabolic, cardiac)    Bianka Kearney MD    Pt is a 75 yo with h/o HTN, DM, CKD and large CVA in 7/17 now comatose and 1 mg 10 mg Oral TID AC   Vancomycin HCl (VANCOCIN) 1,000 mg in sodium chloride 0.9 % 250 mL IVPB add-vantage 1,000 mg Intravenous Q12H   meropenem (MERREM) IVPB 500 mg/100 ml in 0.9% NaCl minibag 500 mg Intravenous Q8H   dextrose 10 % infusion  Intravenous C cont vent    CKD  SCR 1.26  Plan follow    HTN  Well controlled    N/V  Nl exam  Plan hold TFs   AXR x 1   Follow    DVT px  Hep SQ              Josef Correa MD  9/16/2017  11:36 AM

## 2017-09-17 NOTE — CONSULTS
[facility]    PATIENT'S NAME: Marta Jimenez   ATTENDING PHYSICIAN: Destiny Vazquez MD   CONSULTING PHYSICIAN: Amanda Ramirez.  Henrry Mcnamara MD   PATIENT ACCOUNT#:   [id number]    LOCATION:  [address] #:   [id number]       DATE OF BIR pacer, history of some hypertension, presenting now with apparent positive blood cultures, pacemaker near end of life, unresponsive from CVA from July, even to painful stimuli. Patient coming in _______ antibiotic therapy.   We were consulted secondary to

## 2017-09-17 NOTE — PROGRESS NOTES
Patient known to me from prior. Case discussed with Dr Jerel Sheridan. Plan for repeat BC x 2. Monitor for 48 hours. If negative, proceed with pacemaker generator change. Overall his prognosis is poor.

## 2017-09-17 NOTE — PROGRESS NOTES
[facility]    Cardiology Progress Note    Sandre Presume Patient Status:  Inpatient    1950 MRN [id number]   Location [facility] 2W/SW Attending Barbara Law MD   Hosp Day # 1 PCP Dmitri Reyes MD     Patient Active P Tube Q4H   • Amantadine HCl  50 mg Oral Q12H   • aspirin  81 mg Oral Daily   • atorvastatin  20 mg Oral Nightly   • carvedilol  6.25 mg Oral BID with meals   • famoTIDine  20 mg Oral BID   • Heparin Sodium (Porcine)  5,000 Units Subcutaneous Q8H [address]   • Le reflect enteritis or ileus. Xr Chest Ap Portable  (cpt=71010)    Result Date: 9/17/2017  CONCLUSION:  1. Mild cardiomegaly. 2. Bilateral mixed alveolar and interstitial opacification was passed the bases with slight progression.  3. Differential includ

## 2017-09-17 NOTE — PROGRESS NOTES
[facility]    Progress Note    Florencia Dowd Patient Status:  Inpatient    1950 MRN [id number]   Location [facility] 2W/SW Attending Verenice Lorenzo MD   Hosp Day # 1 PCP Maximus Hubbard MD     Subjective:     Unable to ALKPHO 90 09/17/2017   BILT 0.8 09/17/2017   TP 6.6 09/17/2017   AST 26 09/17/2017   ALT 33 09/17/2017   MG 1.7 (L) 09/17/2017   PHOS 3.4 09/17/2017   TROP 0.12 (HH) 09/17/2017       Xr Abdomen (1 View) (cpt=74000)    Result Date: 9/16/2017  CONCLUSION:

## 2017-09-17 NOTE — PROGRESS NOTES
RESPIRATORY THERAPY MECHANICAL VENTILATION PROGRESS NOTE    Ventilator Weaning:  Patient meets criteria for weaning? no Weaning was attempted no   Current Ventilator Data:        Therapist recommendations:   P recommends      09/17/17 0737   Readings   T

## 2017-09-17 NOTE — PROGRESS NOTES
Pulmonary/Critical Care Follow Up Note    HPI:   Honorio Sommer is a 74year old male with Patient presents with:  Abnormal Result (metabolic, cardiac)      PCP Pati Garcia MD  Admission Attending Kyree Morris 15 Day #1    Low H/H and mg, Intravenous, Q8H  •  dextrose 10 % infusion, , Intravenous, Continuous PRN  •  dextrose 50% injection 50 mL, 50 mL, Intravenous, PRN  •  Glucose-Vitamin C (DEX-4) 4-0.006 g chewable tab 4 tablet, 4 tablet, Oral, Q15 Min PRN  •  Insulin Regular Human (N again   ducolax RI                 DVT px  Hep SQ       Chase Broderick MD

## 2017-09-17 NOTE — PROGRESS NOTES
INFECTIOUS DISEASE PROGRESS NOTE    Chema Cooney Patient Status:  Inpatient    1950 MRN [id number]   Location [facility] 2W/SW Attending Elenita Salazar MD   Hosp Day # 1 PCP John Cotton MD     Subjective:  Afebrile.  Remains on vent requested screening blood cultures prior to addressing the pacemaker with BCx 9/14 with GPC in clusters in 2/2 sets. This patient was sent to the ER. On admission patient with a temperature of 99.9 and WBC of 12.8. Started on IV vancomycin and meropenem.

## 2017-09-17 NOTE — CONSULTS
[facility]OSP - [facility]    Report of Consultation    Freddie Concepcion Patient Status:  Inpatient    1950 MRN [id number]   Location [facility] 2W/SW Attending Trung Vera MD   Hosp Day # 1 PCP Ric Elaine MD     Date of Admission: (PROTONIX) 40 mg in Sodium Chloride 0.9 % 10 mL IV push 40 mg Intravenous Q12H   Amantadine HCl (SYMMETREL) syrup 50 mg 50 mg Oral Q12H   acetaminophen (TYLENOL) tab 325 mg 325 mg Oral Q6H PRN   aspirin chewable tab 81 mg 81 mg Oral Daily   atorvastatin (L Doxazosin Mesylate 8 MG Oral Tab Take 10 mg by mouth nightly. psyllium 28 % Oral Powd Pack Take 1 packet by mouth daily. Multiple Vitamin (MULTI-VITAMIN DAILY) Oral Tab Take 5 mL by mouth.    Cholecalciferol 2000 units Oral Cap Take 2,000 Units by lauro 09/17/2017   HCT 20.7 (L) 09/17/2017    (L) 09/17/2017   CREATSERUM 1.25 09/17/2017   BUN 18 09/17/2017    09/17/2017   K 3.2 (L) 09/17/2017    (H) 09/17/2017   CO2 18 (L) 09/17/2017    (H) 09/17/2017   CA 7.5 (L) 09/17/2017   ALB Atherosclerotic aorta with no visible aneurysm. ET tube in satisfactory position. Transvenous pacing leads unchanged. LUNGS/PLEURA: Bilateral mixed alveolar and interstitial opacification most prominent in the bases with slight progression.   Dictated by CONCLUSION:  1. Right PICC line has been intervally retracted with tip now projecting over the superior vena cava.  2. Left greater than right basilar hazy air space opacities, similar in configuration to prior exam. Findings may be related to pneumon baseline. 1. Hgb drop  Unclear etiology or baseline. No overt signs of bleeding at this time. Given his critically ill state and lack of overt bleed will manage conservatively. Transfusion occurring at this time, will monitor response. Trend CBC.  Have s

## 2017-09-18 PROBLEM — Z71.89 GOALS OF CARE, COUNSELING/DISCUSSION: Status: ACTIVE | Noted: 2017-01-01

## 2017-09-18 PROBLEM — Z71.89 ADVANCED CARE PLANNING/COUNSELING DISCUSSION: Status: ACTIVE | Noted: 2017-01-01

## 2017-09-18 NOTE — PROGRESS NOTES
Patient seen in follow up. Unresponsive  On Vent  Mostly paced. Elevated BS  Discussed with nursing staff  Chart reviewed.         09/18/17  0900   BP: 136/61   Pulse: 89   Resp: 20   Temp:        Intake/Output Summary (Last 24 hours) at 09/18/17 13 Metoclopramide HCl (REGLAN) tab 10 mg 10 mg Oral TID AC   meropenem (MERREM) IVPB 500 mg/100 ml in 0.9% NaCl minibag 500 mg Intravenous Q8H   dextrose 10 % infusion  Intravenous Continuous PRN   dextrose 50% injection 50 mL 50 mL Intravenous PRN   Glucose- bisacodyl 10 MG Rectal Suppos Place 10 mg rectally daily. INSULIN REGULAR HUMAN 100 UNIT/ML Injection Solution Inject into the skin 3 (three) times daily before meals. carvedilol 6.25 MG Oral Tab Take 6.25 mg by mouth 2 (two) times daily with meals.

## 2017-09-18 NOTE — PROGRESS NOTES
[facility]  Gastroenterology Progress Note    Matthew Darling Patient Status:  Inpatient    1950 MRN [id number]   Location [facility] 2W/SW Attending Maciej Morales MD   Hosp Day # 2 PCP Negrito Bradshaw MD     Subjective:  Godwin Mares FACG  9/18/2017  3:15 PM

## 2017-09-18 NOTE — CONSULTS
[address] Patient Status:  Inpatient    1950 MRN [id number]   Location [facility] 2W/SW Attending Rizwan Gipson MD   Hosp Day # 2 PCP Galindo Waddell MD     Date of Con for permanent pacemaker placement, HTN, CKD, hypothyroidism, chronic respiratory failure, coma, decubitus ulcer, s/p PEG placement, and s/p RUE PICC placement. Other history as shown below.   I was asked by Dr. Riley Luong to evaluate patient for palliative care care handout provided. We talked about Florencio’s current clinical condition. We talked about the UTI and bacteremia. We talked about his functional and cognitive decline since his large CVA in July 2017.  I provided education about the typical disease trajec ill. Family only told me that he would never want to be made a DNR.     Medical History:  Past Medical History:   Diagnosis Date   • Chronic respiratory failure ([address]    • CKD (chronic kidney disease)    • Coma ([address]    • CVA (cerebral vascular accident) (H aspirin chewable tab 81 mg, 81 mg, Oral, Daily  •  atorvastatin (LIPITOR) tab 20 mg, 20 mg, Oral, Nightly  •  carvedilol (COREG) tab 6.25 mg, 6.25 mg, Oral, BID with meals  •  Heparin Sodium (Porcine) 5000 UNIT/ML injection 5,000 Units, 5,000 Units, Subcut gas-distention of small bowel and colon. Findings are nonspecific but may reflect enteritis or ileus. Xr Chest Ap Portable  (cpt=71010)    Result Date: 9/17/2017  CONCLUSION:  1. Mild cardiomegaly.  2. Bilateral mixed alveolar and interstitial opacific involved and speak with family  Code status: FULL CODE; no limits set at this time  Have advanced directives been discussed with health care surrogate decision-makers: Yes     Hospitalization:  FULL CODE; no limits set at this time.     Assessment/Recommend wife tomorrow at 2:00 p.m.     Yomaira Dahl NP-C, CINDY-BC, [facility], [id number]  9/18/2017  2:34 PM

## 2017-09-18 NOTE — CM/SW NOTE
CTL update from Aubree Campo; Palliative Care APN regarding family meeting to discuss goals of care  And the benefits of Palliative Care and Hospice. Family would like to continue with aggressive treatment.     They are in agreement with returning back to [facility]

## 2017-09-18 NOTE — PLAN OF CARE
Problem: Patient Centered Care  Goal: Patient preferences are identified and integrated in the patient's plan of care  Interventions:  - What would you like us to know as we care for you?   - Provide timely, complete, and accurate information to patient/fa activity  - Administer anti-seizure medications as ordered  - Monitor neurological status   Outcome: Progressing      Comments: Patient is trach and assisted vent. tube feeding in progress. paliative consult done. reposition done. trach care and mouth care giv

## 2017-09-18 NOTE — PROGRESS NOTES
09/18/17 0726   Readings   Total RR 26   Minute Ventilation (L/min) 12.9 L/min   Expiratory Tidal Volume 500 mL   PIP Observed (cm H2O) 32 cm H2O   MAP (cm H2O) 13   Auto PEEP Observed (cm H2O) 7 cm H2O   Plateau Pressure (cm H2O) 27 cm H2O

## 2017-09-18 NOTE — CONSULTS
I was asked by Dr. Jyoti Mckinnon and Dr. Cass Coates to evaluate patient for palliative GOC discussion. When I entered the patient's room, there was no family at the bedside.  Thus, I called and spoke with wife Ivet Hernandez who tells me that she will be at the hospital at

## 2017-09-18 NOTE — PROGRESS NOTES
INFECTIOUS DISEASE PROGRESS NOTE    Kendy Zavaleta Patient Status:  Inpatient    1950 MRN [id number]   Location [facility] 2W/SW Attending Ugo John MD   Hosp Day # 2 PCP Brent Temple MD     Subjective:  Improved fever curve and trach/PEG. Patient apparently recently completed a course of vancomycin and is being treated for ESBL in the urine with Ertapenem.   Recent pacemaker check showed a low battery reading was seen by his cardiologist who requested screening blood cultures giana

## 2017-09-18 NOTE — PROGRESS NOTES
120 [facility] dosing service    Follow-up Pharmacokinetic Consult for Vancomycin Dosing     Sara Schmid is a 74year old male admitted on 9/15 who is being treated for bacteremia.    Patient is on day 2 of Vancomycin being held due to high trough leve and renal function)    2. Pharmacy will re-check Vancomycin trough levels prior to 3rd dose. Goal trough level 15-20 ug/mL. 3.  Pharmacy will need BUN/Scr daily while on Vancomycin to assess renal function.     4.  Pharmacy will follow and monitor juanito

## 2017-09-18 NOTE — DISCHARGE PLANNING
RUDY following up on d/c planning for the patient. He was admitted from [facility] and updates sent to them today. He is in CCU and is trached/vented and no d/c plans at this time.     Per [address] [place] [address], the patient is likely long term with

## 2017-09-18 NOTE — PROGRESS NOTES
[facility]    Progress Note    Janee Best Patient Status:  Inpatient    1950 MRN [id number]   Location [facility] 2W/SW Attending Meño Burden MD   Hosp Day # 2 PCP Rosemarie Flood MD     Subjective:     Unable to 09/17/2017   BILT 0.8 09/17/2017   TP 6.6 09/17/2017   AST 26 09/17/2017   ALT 33 09/17/2017   MG 1.7 (L) 09/18/2017   PHOS 3.4 09/17/2017   TROP 0.12 (HH) 09/17/2017       Xr Abdomen (1 View) (cpt=74000)    Result Date: 9/16/2017  CONCLUSION:   [employer] patients current state of illness, I anticipate that, after discharge, patient will require TBD.         Paula Agustin MD  9/18/2017

## 2017-09-18 NOTE — PROGRESS NOTES
Pulmonary/Critical Care Follow Up Note    HPI:   Andie Garcia is a 74year old male with Patient presents with:  Abnormal Result (metabolic, cardiac)      PCP Anna Arenas MD  Admission Attending Kyree Ly 15 Day #2    No events in 0.9% NaCl minibag, 500 mg, Intravenous, Q8H  •  dextrose 10 % infusion, , Intravenous, Continuous PRN  •  dextrose 50% injection 50 mL, 50 mL, Intravenous, PRN  •  Glucose-Vitamin C (DEX-4) 4-0.006 g chewable tab 4 tablet, 4 tablet, Oral, Q15 Min PRN  •

## 2017-09-18 NOTE — PROGRESS NOTES
RESPIRATORY THERAPY MECHANICAL VENTILATION PROGRESS NOTE    Ventilator Weaning:  Patient meets criteria for weaning? no Weaning was attempted no   Current Ventilator Data:        Therapist recommendations:   RCP recommends

## 2017-09-19 NOTE — PLAN OF CARE
Problem: Diabetes/Glucose Control  Goal: Glucose maintained within prescribed range  INTERVENTIONS:  - Monitor Blood Glucose as ordered  - Assess for signs and symptoms of hyperglycemia and hypoglycemia  - Administer ordered medications to maintain glucose applicable  - Encourage broncho-pulmonary hygiene including cough, deep breathe, Incentive Spirometry  - Assess the need for suctioning and perform as needed  - Assess and instruct to report SOB or any respiratory difficulty  - Respiratory Therapy support

## 2017-09-19 NOTE — PROGRESS NOTES
[facility]    Progress Note    Scott Chau Patient Status:  Inpatient    1950 MRN [id number]   Location [facility] 2W/SW Attending Bing Kramer MD   Hosp Day # 3 PCP Richelle Oviedo MD     Subjective:     Unable to

## 2017-09-19 NOTE — PROGRESS NOTES
INFECTIOUS DISEASE PROGRESS NOTE    Kendy Zavaleta Patient Status:  Inpatient    1950 MRN [id number]   Location [facility] 2W/SW Attending Ugo John MD   Hosp Day # 3 PCP Brent Temple MD     Subjective:  Afebrile. On vent.  No acu [address] who is now ventilator dependent and nonverbal since the CVA now s/p trach/PEG. Patient apparently recently completed a course of vancomycin and is being treated for ESBL in the urine with Ertapenem.   Recent pacemaker check showed a low

## 2017-09-19 NOTE — PROGRESS NOTES
Patient seen in follow up.    Non verbal. Trach/PEG   JÚNIOR negative for vegetation   Vitals reviewed and stable  Negative output    Labs reviewed K 3.5, Creatinine 1.52, Hgb 7.8, WBC 12.7    09/19/17  1000   BP: 124/64   Pulse: 77   Resp: 19   Temp: carvedilol (COREG) tab 6.25 mg 6.25 mg Oral BID with meals   Heparin Sodium (Porcine) 5000 UNIT/ML injection 5,000 Units 5,000 Units Subcutaneous Q8H [address]   Levothyroxine Sodium (SYNTHROID) tab 100 mcg 100 mcg Oral Before breakfast   lisinopril (Tomasa [place] Heparin Sodium, Porcine, 5000 UNIT/ML Injection Solution Inject 5,000 Units into the skin every 8 (eight) hours. bisacodyl 10 MG Rectal Suppos Place 10 mg rectally daily.    INSULIN REGULAR HUMAN 100 UNIT/ML Injection Solution Inject into the skin 3 (thre

## 2017-09-19 NOTE — CM/SW NOTE
+BPCI. Patient is eligible for BPCI enrollment under  and still eligible if in Palliative Care. Met with family at be bedside to explain and enroll in [facility] under [phone] (sepsis). Family agreed with f/u from [employer].        Family have declined

## 2017-09-19 NOTE — CONSULTS
Patient seen with no family at the bedside. Plan from yesterday had been to meet with patient's wife today at 2:00 pm in order to follow-up regarding our palliative care [address] discussion from yesterday. I left a VM on the home phone.     Will attempt again to

## 2017-09-19 NOTE — PLAN OF CARE
Altered Communication/Language Barrier    • Patient/Family is able to understand and participate in their care Progressing        Diabetes/Glucose Control    • Glucose maintained within prescribed range Progressing        Integumentary status not within de

## 2017-09-19 NOTE — PROGRESS NOTES
[facility]    Progress Note    Freddie Concepcion Patient Status:  Inpatient    1950 MRN [id number]   Location [facility] 2W/SW Attending Trung Vera MD   Hosp Day # 3 PCP Ric Elaine MD     Subjective:     Unable to 09/17/2017    (L) 09/15/2017                           Ryan Hillman MD  9/19/2017

## 2017-09-19 NOTE — PROGRESS NOTES
[facility] Pharmacy Note:  Renal Adjustment for Merrem (meropenem)    Selvin Presumgabo is a 74year old male who has been prescribed Merrem (meropenem) 500 mg every 8 hrs. CrCl is estimated creatinine clearance is 39 mL/min (based on SCr of 1.52 mg/dL (H)).  so

## 2017-09-20 NOTE — PLAN OF CARE
Problem: Diabetes/Glucose Control  Goal: Glucose maintained within prescribed range  INTERVENTIONS:  - Monitor Blood Glucose as ordered  - Assess for signs and symptoms of hyperglycemia and hypoglycemia  - Administer ordered medications to maintain glucose support as indicated  - Manage/alleviate anxiety  - Monitor for signs/symptoms of CO2 retention   Outcome: Not Progressing  Remains on ventilator support. Trach. Lung sounds rhonci bilaterally. Moderate amount of secretions noted inline and orally.  Aquilino Clifford

## 2017-09-20 NOTE — PROGRESS NOTES
INFECTIOUS DISEASE PROGRESS NOTE    Roge Arroyo Patient Status:  Inpatient    1950 MRN [id number]   Location [facility] 2W/SW Attending Twan Jackson MD   Hosp Day # 4 PCP Dayanna Juares MD     Subjective:  Afebrile. On vent.  No acu Kat 6 with eventual transfer to the [address] [place] [address] who is now ventilator dependent and nonverbal since the CVA now s/p trach/PEG.   Patient apparently recently completed a course of vancomycin and is being treated for ESBL in the urine with

## 2017-09-20 NOTE — PROGRESS NOTES
INFECTIOUS DISEASE PROGRESS NOTE    Minoo Cho Patient Status:  Inpatient    1950 MRN [id number]   Location [facility] 2W/SW Attending Pancho Arroyo MD   Hosp Day # 4 PCP Gian Dawson MD     Subjective:  Afebrile. On vent.  Appear hospital with eventual transfer to the [address] [place] [address] who is now ventilator dependent and nonverbal since the CVA now s/p trach/PEG.   Patient apparently recently completed a course of vancomycin and is being treated for ESBL in the urine with Ertapenem

## 2017-09-20 NOTE — PROGRESS NOTES
[facility]    Progress Note    Minoo Cho Patient Status:  Inpatient    1950 MRN [id number]   Location [facility] 2W/SW Attending Pancho Arroyo MD   Hosp Day # 4 PCP Gian Dawson MD     Subjective:     Unable to 90 09/17/2017   BILT 0.8 09/17/2017   TP 6.6 09/17/2017   AST 26 09/17/2017   ALT 33 09/17/2017   MG 2.1 09/20/2017   PHOS 3.4 09/17/2017   TROP 0.12 () 09/17/2017       Xr Chest Ap Portable  (cpt=71010)    Result Date: 9/20/2017  CONCLUSION:  1.  Patchy

## 2017-09-20 NOTE — PROGRESS NOTES
Pulmonary/Critical Care Follow Up Note    HPI:   Roge Arroyo is a 74year old male with Patient presents with:  Abnormal Result (metabolic, cardiac)      PCP Dayanna Juares MD  Admission Attending Kyree Davis 15 Day #4    No events  B 5,000 Units, 5,000 Units, Subcutaneous, Q8H [address]  •  Levothyroxine Sodium (SYNTHROID) tab 100 mcg, 100 mcg, Oral, Before breakfast  •  lisinopril (PRINIVIL,ZESTRIL) tab 2.5 mg, 2.5 mg, Oral, Daily  •  LORazepam (ATIVAN) tab 1 mg, 1 mg, Oral, Q6H PRN  •  dex changed in pacemaker       Ketty Whitney MD

## 2017-09-20 NOTE — RESPIRATORY THERAPY NOTE
Pt. Initially placed on CPAP 5/ PS 10. Patient not tolerating PS 10. Increase respiratory rates to high 40'S. Increased PS to 15. Patient more comfortable. Decrease in respiratory rates to 28 to 30.

## 2017-09-20 NOTE — PROGRESS NOTES
Patient seen in follow up. No events overnight   Non verbal   Vitals reviewed. No fever.  BP stable   Labs reviewed K 3.8, creatinine 1.64, Mag 2.1, WBC 17, Hgb 8.9, Plt 189   Blood cultures negative      09/20/17  1200   BP: 132/65   Pulse: 69   Re Heparin Sodium (Porcine) 5000 UNIT/ML injection 5,000 Units 5,000 Units Subcutaneous Q8H [facility]   Levothyroxine Sodium (SYNTHROID) tab 100 mcg 100 mcg Oral Before breakfast   lisinopril (PRINIVIL,ZESTRIL) tab 2.5 mg 2.5 mg Oral Daily   LORazepam (ATIVAN) tab bisacodyl 10 MG Rectal Suppos Place 10 mg rectally daily. INSULIN REGULAR HUMAN 100 UNIT/ML Injection Solution Inject into the skin 3 (three) times daily before meals. carvedilol 6.25 MG Oral Tab Take 6.25 mg by mouth 2 (two) times daily with meals.

## 2017-09-20 NOTE — PROGRESS NOTES
[facility] Pharmacy Note: Route Optimization for Pantoprazole (PROTONIX)    Patient is currently on Pantoprazole (PROTONIX) 40 mg IV every 12 hours.    The patient meets the criteria to convert to the oral equivalent as established by the IV to Oral conversion pro

## 2017-09-20 NOTE — CONSULTS
Patient seen with wife at the bedside. Hany Langston continues to be unresponsive, not opening eyes, and mechanically ventilated. He appears overall comfortable with no nonverbal signs of pain or dyspnea observed.     In follow-up to our palliative care [address] discuss

## 2017-09-20 NOTE — PROGRESS NOTES
[facility] Pharmacy Note:  Renal Dose Adjustment for Metoclopramide (REGLAN)    Monroe Arnett has been prescribed Metoclopramide (REGLAN) 10 mg TID before meals. Estimated Creatinine Clearance: 35.7 mL/min (based on SCr of 1.64 mg/dL (H)).     His calculat

## 2017-09-21 NOTE — OPERATIVE REPORT
Preop diagnosis: pacemaker generator at eol  Post op diagnosis: same  Procedures: generator change  Findings: as above  EBL: 2 mls  Specimens: None

## 2017-09-21 NOTE — PROGRESS NOTES
Patient seen in follow up. No events overnight   Non verbal   Vitals reviewed.  One times low BP   Labs reviewed creatinine 1.55  Blood cultures negative   S/p pacer battery change     09/21/17  1045   BP: 110/54   Pulse: 69   Resp: 25   Temp: carvedilol (COREG) tab 6.25 mg 6.25 mg Oral BID with meals   Heparin Sodium (Porcine) 5000 UNIT/ML injection 5,000 Units 5,000 Units Subcutaneous Q8H [facility]   Levothyroxine Sodium (SYNTHROID) tab 100 mcg 100 mcg Oral Before breakfast   lisinopril (Ulysses Lye Heparin Sodium, Porcine, 5000 UNIT/ML Injection Solution Inject 5,000 Units into the skin every 8 (eight) hours. bisacodyl 10 MG Rectal Suppos Place 10 mg rectally daily.    INSULIN REGULAR HUMAN 100 UNIT/ML Injection Solution Inject into the skin 3 (thre    myocardium. Doppler parameters are consistent with abnormal left ventricular     relaxation (grade 1 diastolic dysfunction). 2. Aortic valve: Sclerosis without stenosis. 3. Pulmonary arteries: PA peak pressure: 32mm Hg (S).   No previous study was avai

## 2017-09-21 NOTE — PROGRESS NOTES
[facility] dosing service    Follow-up Pharmacokinetic Consult for Vancomycin Dosing     Rob Baxter is a 74year old male admitted on 9/16 who is being treated for bacteremia. Patient is on day 6 of Vancomycin 1 gm IV Q 24 hours.   Goal trough i of 31.1 ug/mL, pharmacokinetics, 58.5kg weight and renal function)    2. Pharmacy will re-check Vancomycin trough levels prior to 2nd dose. Goal trough level 15-20 ug/mL.     3.  Pharmacy will need BUN/Scr daily while on Vancomycin to assess renal functio

## 2017-09-21 NOTE — PROGRESS NOTES
INFECTIOUS DISEASE PROGRESS NOTE    Matthew Darling Patient Status:  Inpatient    1950 MRN [id number]   Location [facility] 2W/SW Attending Maciej Morales MD   Hosp Day # 5 PCP Negrito Bradshaw MD     Subjective:  Afebrile. Weaning.  Pacer dependent and nonverbal since the CVA now s/p trach/PEG. Patient apparently recently completed a course of vancomycin and is being treated for ESBL in the urine with Ertapenem.   Recent pacemaker check showed a low battery reading was seen by his cardiolog

## 2017-09-21 NOTE — PROGRESS NOTES
INFECTIOUS DISEASE PROGRESS NOTE    Shadi Roman Patient Status:  Inpatient    1950 MRN [id number]   Location [facility] 2W/SW Attending Guy Washington MD   Hosp Day # 5 PCP Scott Waterman MD     Subjective:  Afebrile.  No obvious dist since the CVA now s/p trach/PEG. Patient apparently recently completed a course of vancomycin and is being treated for ESBL in the urine with Ertapenem.   Recent pacemaker check showed a low battery reading was seen by his cardiologist who requested screen

## 2017-09-21 NOTE — PLAN OF CARE
Problem: Diabetes/Glucose Control  Goal: Glucose maintained within prescribed range  INTERVENTIONS:  - Monitor Blood Glucose as ordered  - Assess for signs and symptoms of hyperglycemia and hypoglycemia  - Administer ordered medications to maintain glucose respiratory difficulty  - Respiratory Therapy support as indicated  - Manage/alleviate anxiety  - Monitor for signs/symptoms of CO2 retention   Outcome: Progressing  Remains on ventilator - on pressure support 15/5, FiO2: 40%.  Tolerating current vent setti

## 2017-09-21 NOTE — WOUND PROGRESS NOTE
WOUND CARE NOTE      PLAN   Recommendations:  Dietary consult for recommendations for nutrition to optimize wound healing- Following, pt. has G tube with feedings  Turn schedules  Heels elevated using pillows, heel wedge or heel boots to offload heels  U

## 2017-09-21 NOTE — DISCHARGE SUMMARY
[facility]  Discharge Summary    Kendy Zavaleta Patient Status:  Inpatient    1950 MRN [id number]   Location [facility] 2W/ Attending Ugo John MD   Hosp Day # 5 PCP Brent Temple MD     Date of Admission: 9/15/2017    Da Palliative care due to poor prognosis and bacteremia. Family declined comfort measures or palliative care at this time. He was discharged to the [place] once stable.      Consultations:   Consultants     Provider Role Specialty    Santo Claude, MD Consultin mouth.    Cholecalciferol 2000 units Oral Cap  Take 2,000 Units by mouth. Levothyroxine Sodium 100 MCG Oral Tab  Take 100 mcg by mouth before breakfast.    Senna 8.6 MG Oral Tab  Take 8.6 mg by mouth 2 (two) times daily.     polyethylene glycol 236 G Ora

## 2017-09-21 NOTE — PROGRESS NOTES
Pulmonary/Critical Care Follow Up Note    HPI:   Honorio Sommer is a 74year old male with Patient presents with:  Abnormal Result (metabolic, cardiac)      PCP Pati Garcia MD  Admission Attending Kyree Morris 15 Day #5    S/p battery 6.25 mg, 6.25 mg, Oral, BID with meals  •  Heparin Sodium (Porcine) 5000 UNIT/ML injection 5,000 Units, 5,000 Units, Subcutaneous, Q8H [address]  •  Levothyroxine Sodium (SYNTHROID) tab 100 mcg, 100 mcg, Oral, Before breakfast  •  lisinopril (PRINIVIL,ZESTRIL) t pacemaker    Family at bedside  They had zero questions       Rajesh Galicia MD

## 2017-09-21 NOTE — DISCHARGE PLANNING
RUDY following up on d/c planning for the patient. He is stable for d/c today to The [address]). RN to call [phone] for report to the 2nd floor, team 1 RN.   [facility] [phone] arranged for 5pm.  Family no

## 2017-09-21 NOTE — PLAN OF CARE
Problem: RISK FOR INFECTION - ADULT  Goal: Absence of fever/infection during anticipated neutropenic period  INTERVENTIONS  - Monitor WBC  - Administer growth factors as ordered  - Implement neutropenic guidelines   Outcome: Progressing  WBC 16.4, meropene

## 2017-09-21 NOTE — PROGRESS NOTES
[facility] RESPIRATORY THERAPY MECHANICAL VENTILATION PROGRESS NOTE     09/21/17 1059   Vent Information   Vent Mode PS   Settings   FiO2 (%) 40 %   PEEP/CPAP (cm H2O) 5 cm H20   Press Support CWP 15     Pt transported to and from Cath Lab for procedure.   Placed on

## 2017-09-21 NOTE — PROGRESS NOTES
[facility]    Progress Note    Sandre Presume Patient Status:  Inpatient    1950 MRN [id number]   Location [facility] 2W/SW Attending Barbara Law MD   Hosp Day # 5 PCP Dmitri Reyes MD     Subjective:     Unable to 09/21/2017    09/21/2017   CREATSERUM 1.55 (H) 09/21/2017   BUN 44 (H) 09/21/2017    09/21/2017   K 3.6 09/21/2017   K 3.6 09/21/2017    09/21/2017   CO2 24 09/21/2017    (H) 09/21/2017   CA 8.1 (L) 09/21/2017   ALB 1.6 (L) 09/17/

## 2017-09-21 NOTE — PROCEDURES
[facility]    PATIENT'S NAME: Angus Nash   ATTENDING PHYSICIAN: Aly Duran MD   OPERATING PHYSICIAN: Tariq Guerrero DO   PATIENT ACCOUNT#:   [id number]    LOCATION:  [address] [place] [address] #:   [id number]       DATE OF BIRTH: horizontal mattress and a 3-0 Vicryl vertical mattress and a 4-0 Vicryl for the subcuticular layer. Hemostasis was maintained throughout. An occlusive dressing was then placed followed by a pressure dressing. Sponge and needle counts were correct.   The

## 2017-09-21 NOTE — PROGRESS NOTES
Discussed with Dr. Jinny Robins: patient on dual antibiotic therapy,  Vancomycin level trough level 31  No further antibiotic needed as per M.D,  Proceeding with PPM generator change.

## 2017-09-25 NOTE — TELEPHONE ENCOUNTER
Patient's son left me a VM stating that his father has a large pressure ulcer and seems to be in pain. I had asked SW to make a referral to [facility] for palliative care to follow the patient back to the [facility].  Thus, I called them to ensure that t

## 2017-10-01 PROBLEM — D64.9 ANEMIA, UNSPECIFIED TYPE: Status: ACTIVE | Noted: 2017-01-01

## 2017-10-01 PROBLEM — D64.9 ANEMIA: Status: ACTIVE | Noted: 2017-01-01

## 2017-10-01 NOTE — H&P
[facility]    History and Physical    Janee Best Patient Status:  Inpatient    1950 MRN [id number]   Location [facility] 2W/SW Attending Jamal Renteria MD   Hosp Day # 0 PCP Rosemarie Flood MD     Date:  10/1/2017 Suspension Inject 32 Units into the skin every 12 (twelve) hours. acetaminophen 325 MG Oral Tab Take 325 mg by mouth every 6 (six) hours as needed for Pain. Acidophilus/Pectin Oral Cap Take 1 capsule by mouth 2 (two) times daily.    LORazepam 1 MG Oral for vomiting (Per wife patient was vomiting 3 days ago bilious yellowish to it. Thought due to tube feeds and it was held and restarted. ). Genitourinary:        Chronic Aleman since stroke in July 2017.   Try to remove Aleman and could not urinate ended up cervical: No superficial cervical, no deep cervical and no posterior cervical adenopathy present. Left cervical: No superficial cervical, no deep cervical and no posterior cervical adenopathy present.         Right: No supraclavicular adenopathy prese care.       Hypokalemia. Electrolyte protocol. Renal insufficiency. May likely improve with blood. Diabetes type II. Will await A1c. In the meantime sliding scale insulin hypoglycemia protocol and Accu-Cheks every 6 due to n.p.o. Status.

## 2017-10-01 NOTE — ED NOTES
Care assumed from EMS presents from SNF secondary to HGB 7.6 Arrives alert S/P stroke vent dependent with no purposeful interactions. Ventricular paced rhythm.  #6 Shiley trach placed on AC 14 440% PEEP 4  with + spontaneous ventilation Coarse BS suct

## 2017-10-01 NOTE — CONSULTS
[facility] HOSP - [facility]    Report of Consultation    Chiara Weiner Patient Status:  Emergency    1950 MRN [id number]   Location [address] Attending Bisi Leon MD   Hosp Day # 0 PCP Steve Maldonado MD curvature, ROM normal, no CVA tenderness   Lungs:     Clear to auscultation bilaterally, respirations unlabored   Chest wall:    No tenderness or deformity   Heart:    Regular rate and rhythm, S1 and S2 normal, no murmur, rub   or gallop   Abdomen:     Sof

## 2017-10-01 NOTE — ED PROVIDER NOTES
Patient Seen in: [facility] Emergency Department    History   Patient presents with:  Abnormal Result (metabolic, cardiac)    Stated Complaint: low HBG/HCT    HPI    39-year-old male presents for evaluation of anemia.   Patient is a vent dependent Mouth/Throat: Oropharynx is clear and moist.   Eyes: Conjunctivae are normal.   Left pupil midline and reactive, right eye chronic appearing cataract   Cardiovascular: Normal rate, regular rhythm and normal heart sounds.     Pulmonary/Chest: Effort normal AOGA)[[id number]]                               Final result               ANTIBODY SCREEN[[id number]]                                  Final result                 Please view results for these tests on the individual orders.    ABORH (BLOOD TYPE)   ANTIBODY

## 2017-10-02 NOTE — PROGRESS NOTES
[facility]    Progress Note    Mak Hernandez Patient Status:  Inpatient    1950 MRN [id number]   Location [facility] 2W/SW Attending Krystla Schwarz MD   Hosp Day # 1 PCP Walter Wooten MD     Subjective:     Zoran Single throughout slightly prominent large and small bowel down to the level of the rectum. No bowel obstruction. Findings may suggest mild ileus. G-tube in profile with the body the stomach.          Ct Abdomen+pelvis(cpt=74176)    Result Date: 10/2/2017  CONCLUS

## 2017-10-02 NOTE — PROGRESS NOTES
GI  PROGRESS NOTE    SUBJECTIVE: in NAD. Staff report pt had BM yest but not today.        OBJECTIVE:  Temp:  [96.9 °F (36.1 °C)-98.9 °F (37.2 °C)] 98.9 °F (37.2 °C)  Pulse:  [79-96] 83  Resp:  [21-31] 25  BP: (111-148)/(55-85) 124/57  FiO2 (%):  [40 %] 4 PEG 3350, magnesium hydroxide, FLEET ENEMA, Metoclopramide HCl, Normal Saline Flush, Normal Saline Flush, Normal Saline Flush, metoprolol Tartrate, dextrose    _______________________________________________________________    IMPRESSION: Pt is a 74 yr M w

## 2017-10-02 NOTE — CONSULTS
[facility]    Consult Note    Date:  10/2/2017  Date of Admission:  10/1/2017      Chief Complaint:   Ritta Bumpers is a(n) 74year old male with chronic mechanical ventilation.     HPI:   The patient is a history of stroke and dysphagi mouth 2 (two) times daily with meals. Insulin NPH, Human,, Isophane, 100 UNIT/ML Subcutaneous Suspension Inject 32 Units into the skin every 12 (twelve) hours. acetaminophen 325 MG Oral Tab Take 325 mg by mouth every 6 (six) hours as needed for Pain. Systems:   Cannot obtain in this obtunded patient    Physical Exam:   Vital Signs:  Blood pressure 146/79, pulse 92, temperature 97.6 °F (36.4 °C), temperature source Temporal, resp.  rate 21, height 5' 5\" (1.651 m), weight 133 lb 6.4 oz (60.5 kg), SpO2 10 [phone]

## 2017-10-02 NOTE — RESPIRATORY THERAPY NOTE
RESPIRATORY THERAPY PATIENT TRANSPORT NOTE    Transported from: 18  Transported to: ct      Patient is intubated?:yes  Transport ventilator used?:no  Resuscitation bag used during transport?:yes   ETT placement and ventilator function were verified post-t

## 2017-10-02 NOTE — PLAN OF CARE
GASTROINTESTINAL - ADULT    • Maintains adequate nutritional intake (undernourished) Not Progressing          CARDIOVASCULAR - ADULT    • Maintains optimal cardiac output and hemodynamic stability Progressing    • Absence of cardiac arrhythmias or at basel

## 2017-10-02 NOTE — PLAN OF CARE
Problem: ALTERED NUTRIENT INTAKE - ADULT  Goal: Nutrient intake appropriate for improving, restoring or maintaining nutritional needs  INTERVENTIONS:  - Assess nutritional status and recommend course of action  - Monitor  labs, and treatment plans  - Recom

## 2017-10-02 NOTE — CM/SW NOTE
CTL  Update regarding progression of care. Patient is a readmission admitted from 9/15 - 9/21. He was discharged to the [facility].  [phone] .     Patient has a G Tube, Trach, previous CVA, ESBL UTI, previous CVA, DM,HTN, CKD

## 2017-10-02 NOTE — PROGRESS NOTES
10/02/17 0730   Readings   PIP Observed (cm H2O) 29 cm H2O   MAP (cm H2O) 10   Auto PEEP Observed (cm H2O) 5 cm H2O   PEEP Low (cm H2O) 2 cm H2O   Plateau Pressure (cm H2O) 24 cm H2O   Airway Resistance 7.7   RESPIRATORY THERAPY MECHANICAL VENTILATION P

## 2017-10-02 NOTE — WOUND PROGRESS NOTE
WOUND CARE NOTE      PLAN   Recommendations:  Dietary is following for recommendations for nutrition to optimize wound healing  Turn schedules  Heels elevated using pillows, heel wedge or heel boots to offload heels  Use of lift equipment  To prevent sli

## 2017-10-02 NOTE — DIETARY NOTE
ADULT NUTRITION INITIAL ASSESSMENT    Pt is at high nutrition risk. Pt does not meet malnutrition criteria. RECOMMENDATIONS TO MD:   Resume TF when deemed safe. Currently held with emesis (PTA emesis as well).       NUTRITION DIAGNOSIS/PROBLEM: 1)  In Malnutrition ([facility]    • Osteoarthritis    • Pacemaker    • Pneumonia due to organism    • Problems with swallowing    • Renal disorder    • Type 1 diabetes mellitus ([facility]    • Visual impairment          ANTHROPOMETRICS:  HT: 165.1 cm (5' 5\")  WT: 60.5 kg Intravenous Once   • artificial tears   Both Eyes TID   • ipratropium-albuterol  3 mL Nebulization QID   • piperacillin-tazobactam  3.375 g Intravenous Q8H     PRN meds: dextrose, bisacodyl, LORazepam, ondansetron HCl, dextrose 50%, Glucose-Vitamin C, Atro

## 2017-10-03 NOTE — PLAN OF CARE
Diabetes/Glucose Control    • Glucose maintained within prescribed range Progressing        Off insulin drip. Blood sugar better.   GASTROINTESTINAL - ADULT    • Maintains or returns to baseline bowel function Progressing    • Maintains adequate nutritional

## 2017-10-03 NOTE — PLAN OF CARE
CARDIOVASCULAR - ADULT    • Maintains optimal cardiac output and hemodynamic stability Progressing    • Absence of cardiac arrhythmias or at baseline Progressing        Hemodynamically stable.   Diabetes/Glucose Control    • Glucose maintained within prescr

## 2017-10-03 NOTE — PROGRESS NOTES
GI  PROGRESS NOTE    SUBJECTIVE: in NAD.  Staff report pt had BM today; tolerating TF, now at 300 cc/hr      OBJECTIVE:  Temp:  [96.8 °F (36 °C)-98.9 °F (37.2 °C)] 96.8 °F (36 °C)  Pulse:  [75-89] 78  Resp:  [14-27] 18  BP: ()/(56-67) 118/59  FiO2 ( Dextrose-NaCl 50 mL/hr at 10/03/17 0344     dextrose, bisacodyl, LORazepam, ondansetron HCl, dextrose 50%, Glucose-Vitamin C, Atropine Sulfate, nitroGLYCERIN, ipratropium-albuterol, PEG 3350, magnesium hydroxide, FLEET ENEMA, Metoclopramide HCl, Normal Sal

## 2017-10-04 NOTE — PROGRESS NOTES
GI  PROGRESS NOTE    SUBJECTIVE: staff report no difficulties with TF. Small Bm.        OBJECTIVE:  Temp:  [97.5 °F (36.4 °C)-98.9 °F (37.2 °C)] 98.9 °F (37.2 °C)  Pulse:  [75-87] 80  Resp:  [13-30] 30  BP: (117-148)/() 148/70  FiO2 (%):  [40 %] 40 nitroGLYCERIN, ipratropium-albuterol, PEG 3350, magnesium hydroxide, FLEET ENEMA, Metoclopramide HCl, Normal Saline Flush, metoprolol Tartrate    _______________________________________________________________    IMPRESSION: Pt is a 74 yr M with h/o CVA/sa

## 2017-10-04 NOTE — PROGRESS NOTES
[facility]    Progress Note    Rob Baxter Patient Status:  Inpatient    1950 MRN [id number]   Location [facility] 2W/SW Attending Rossi Tirado MD   Hosp Day # 3 PCP Shell Power MD     Subjective:     Shabnam Martinez of small bowel and colon without abrupt caliber transition to suggest a mechanical obstruction. 2. No extraluminal gas or drainable fluid collections. 3. Lung base findings suggestive of aspiration pneumonia superimposed on chronic interstitial changes.

## 2017-10-04 NOTE — PLAN OF CARE
Glucose maintained within prescribed range Not Progressing    Accucheck readings qhrs, >200, both levemir & scheduled doses insulin increased; tube feedings also increased but only by 5ml/hr, will need close monitoring & further increase if readings remain

## 2017-10-04 NOTE — PROGRESS NOTES
RESPIRATORY THERAPY MECHANICAL VENTILATION PROGRESS NOTE    Ventilator Weaning:  Patient meets criteria for weaning? no Weaning was attempted no   Current Ventilator Data:        Therapist recommendations:   RCP recommends      10/04/17 0815   Readings   T

## 2017-10-04 NOTE — PROGRESS NOTES
[facility]    Progress Note      Assessment and Plan:   1. Severe anemia–Likely combination of gastrointestinal loss as well as slow oozing from sacral decubitus.   The hemoglobin is relatively stable at this juncture.     Recommendations:

## 2017-10-05 NOTE — CM/SW NOTE
RUDY following up on d/c planning for the patient. He is trached on a vent in CCU at this time- he was on a vent at [facility]. He is from The [address]) and updates were sent on 10/2. RUDY received a call from [address] [place] [address].   Shabnam

## 2017-10-05 NOTE — PROGRESS NOTES
[facility]    Progress Note      Assessment and Plan:   1. Severe anemia– the hemoglobin continues to fall. It was 8 yesterday and now is 7.4.   The patient may benefit from endoscopy and I spoke with the primary care regarding this concer rate and rhythm no murmur. Abdomen nontender, without hepatosplenomegaly and no mass appreciable. Extremities without clubbing cyanosis nor edema. Neurologic grossly intact with symmetric tone and strength and reflex.      Results:       Lab Results

## 2017-10-05 NOTE — PROGRESS NOTES
[facility]    Progress Note    Monroe Arnett Patient Status:  Inpatient    1950 MRN [id number]   Location [facility] 2W/SW Attending Louise Stanley MD   Hosp Day # 4 PCP Galindo Waddell MD     Subjective:     Criss Wilkinson Chest Ap Portable  (cpt=71010)    Result Date: 10/3/2017  CONCLUSION: No significant change in the appearance of the chest.              **Certification      PHYSICIAN Certification of Need for Inpatient Hospitalization -     Patient will require inpatient

## 2017-10-05 NOTE — PROGRESS NOTES
[facility]    Progress Note    Nitza Rendon Patient Status:  Inpatient    1950 MRN [id number]   Location [facility] 2W/SW Attending Janee Contreras MD   Hosp Day # 4 PCP Kobe Coats MD     Subjective:   Rd Richardson than right which may suggest bibasilar pneumonia or atelectasis. Continued followup is advised.                **Certification      PHYSICIAN Certification of Need for Inpatient Hospitalization -     Patient will require inpatient services that will reasona

## 2017-10-05 NOTE — PLAN OF CARE
HEMATOLOGIC - ADULT    • Maintains hematologic stability Not Progressing          CARDIOVASCULAR - ADULT    • Maintains optimal cardiac output and hemodynamic stability Progressing    • Absence of cardiac arrhythmias or at baseline Progressing        Diabe

## 2017-10-06 PROBLEM — J96.20 ACUTE ON CHRONIC RESPIRATORY FAILURE (HCC): Status: ACTIVE | Noted: 2017-01-01

## 2017-10-06 PROBLEM — L89.152 DECUBITUS ULCER OF SACRAL REGION, STAGE 2 (HCC): Status: ACTIVE | Noted: 2017-01-01

## 2017-10-06 NOTE — PROGRESS NOTES
[facility]    Progress Note    Joe Crespo Patient Status:  Inpatient    1950 MRN [id number]   Location [facility] 2W/SW Attending Jack Monzon MD   Hosp Day # 5 PCP Patric Wing MD     Subjective:     Angela Trujillo chest with persistent bibasilar airspace opacity left more than right which may suggest bibasilar pneumonia or atelectasis. Continued followup is advised.                **Certification      PHYSICIAN Certification of Need for Inpatient Hospitalization - In

## 2017-10-06 NOTE — H&P
History & Physical Examination    Patient Name: Scott Chau  MRN: [id number]  [facility]: [id number]  YOB: 1950    Diagnosis: recurrent anemia. Present Illness: recurrent anemia.        Prescriptions Prior to Admission:  ascorbic acid 500 Take 81 mg by mouth daily. Disp:  Rfl:  10/1/2017 at Unknown time   famoTIDine 20 MG Oral Tab Take 20 mg by mouth 2 (two) times daily. Disp:  Rfl:  10/1/2017 at 0600   bisacodyl 10 MG Rectal Suppos Place 10 mg rectally daily.  Disp:  Rfl:  9/30/2017 at [facility] 10 mL Intravenous PRN   Insulin Regular Human (NOVOLIN R) 100 UNIT/ML injection 4 Units 4 Units Subcutaneous Q6H   Insulin Regular Human (NOVOLIN R) 100 UNIT/ML injection 1-5 Units 1-5 Units Subcutaneous 4 times per day   dextrose 10 % infusion  Intravenou solution 3 mL 3 mL Nebulization QID   dextrose 5 % and 0.45 % NaCl with KCl 20 mEq infusion  Intravenous Continuous       Allergies: No Known Allergies    Past Medical History:   Diagnosis Date   • Chronic respiratory failure (HCC)    • CKD (chronic kidney

## 2017-10-06 NOTE — PROGRESS NOTES
Pulmonary/Critical Care Follow Up Note    HPI:   Vick Bean is a 74year old male with Patient presents with:  Abnormal Result (metabolic, cardiac)      PCP Bijan García MD  Admission Attending Kyree Hendrickson 15 Day #5    Non verbal Oral, Q12H  •  ondansetron HCl (ZOFRAN) injection 4 mg, 4 mg, Intravenous, Q6H PRN  •  dextrose 50% injection 50 mL, 50 mL, Intravenous, PRN  •  Glucose-Vitamin C (DEX-4) 4-0.006 g chewable tab 4 tablet, 4 tablet, Oral, Q15 Min PRN  •  Atropine Sulfate 0.1 C/D/I  Ext trac edema      LABS:      Lab Results  Component Value Date   WBC 15.3 10/06/2017   HGB 9.4 10/06/2017   HCT 28.7 10/06/2017    10/06/2017   CREATSERUM 1.18 10/06/2017   BUN 15 10/06/2017    10/06/2017   K 4.3 10/06/2017    1

## 2017-10-06 NOTE — PROGRESS NOTES
10/06/17 0015 10/06/17 0140   Provider Notification   Reason for Communication Critical value; Other (comment)  (Glucose: 393) Critical value; Other (comment)  (Glucose: 353)   Provider Name Other (comment)  (Dr. Ashia Nair (Nocturnalist)) Other (comment)  (

## 2017-10-06 NOTE — OPERATIVE REPORT
ENDOSCOPY OPERATIVE REPORT    Patient Name: Bryan Burn  Medical Record #: [id number]  YOB: 1950  Date of Procedure: 10/6/2017    Preoperative Diagnosis: recurrent anemia. Postoperative Diagnosis:     1.) Minimal patchy gastritis.

## 2017-10-06 NOTE — PROGRESS NOTES
Pharmacy was consulted to dose Merrem (meropenem) for treatment of pneumonia by Dr. Malinda Hernandez. Body mass index is 23 kg/m².   Wt Readings from Last 6 Encounters:  10/06/17 : 138 lb 3.2 oz  09/21/17 : 129 lb 11.2 oz      Estimated Creatinine Clearance: 52.1 m

## 2017-10-06 NOTE — CONSULTS
INFECTIOUS DISEASE CONSULT NOTE    Sara Schmid Patient Status:  Inpatient    1950 MRN [id number]   Location [facility] 2W/SW Attending Mary Rice MD   Hosp Day # 5 PCP ELO vascular accident) ([facility] 07/2017   • Decubital ulcer    • DM (diabetes mellitus) ([place] [address].)    • Hearing impairment    • History of blood transfusion    • HTN (hypertension)    • Hypothyroidism    • Malnutrition ([facility]    • Osteoarthritis    • Pacemaker    • Pne Intravenous, Q6H PRN  •  dextrose 50% injection 50 mL, 50 mL, Intravenous, PRN  •  Glucose-Vitamin C (DEX-4) 4-0.006 g chewable tab 4 tablet, 4 tablet, Oral, Q15 Min PRN  •  Atropine Sulfate 0.1 MG/ML injection 0.5 mg, 0.5 mg, Intravenous, PRN  •  nitroGLY RDW  18.0*   WBC  15.3*   PLT  163     Recent Labs   Lab  10/05/17   0520  10/05/17   2101  10/06/17   0543   GLU  203*   --   236*   BUN  16   --   15   CREATSERUM  1.24   --   1.18   GFRAA  >60   --   >60   GFRNAA  58*   --   >60   CA  7.6*   --   8.1* here               - possible PICC source; r/o infected lead - JÚNIOR negative for vegetation - s/p vancomycin EOT 9/21/17   - PICC removed 9/19/19 with tip with <15 CFU Staph species  # ESBL UTI at [facility] [facility]  # 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

## 2017-10-07 NOTE — PROGRESS NOTES
[facility] RESPIRATORY THERAPY MECHANICAL VENTILATION PROGRESS NOTE    Ventilator Weaning:  Patient meets criteria for weaning? no Weaning was not attempted, patient is ventilator dependant.     Current Ventilator Data:     10/07/17 1340   Vent Information   Vent Mod

## 2017-10-07 NOTE — PROGRESS NOTES
Patient had tube feeding color emesis this is 2nd time today. Dr. Beltrán Stalls on unit and notified. No new orders received at this time. Handoff report given to Sophia Gaming RN.

## 2017-10-07 NOTE — PROGRESS NOTES
[facility]    Progress Note    Esther Marcos Patient Status:  Inpatient    1950 MRN [id number]   Location [facility] 2W/SW Attending Kelsie Holder MD   Hosp Day # 6 PCP Bianka Kearney MD     Subjective:     Paige Montes 09/17/2017   AST 26 09/17/2017   ALT 33 09/17/2017   PTT 31.0 10/01/2017   INR 1.2 10/01/2017   TSH 3.52 10/01/2017   MG 2.0 10/03/2017   PHOS 2.5 10/03/2017   TROP 0.12 () 09/17/2017   B12 525 10/01/2017       Xr Chest Ap Portable  (cpt=71010)    Result

## 2017-10-07 NOTE — PROGRESS NOTES
Pulmonary/Critical Care Follow Up Note    HPI:   Sandhya Schneider is a 74year old male with Patient presents with:  Abnormal Result (metabolic, cardiac)      PCP Severo Child, MD  Admission Attending Kyree Vallecillo 15 Day #6    Non verbal Sodium (SYNTHROID) tab 100 mcg, 100 mcg, Oral, Before breakfast  •  lisinopril (PRINIVIL,ZESTRIL) tab 2.5 mg, 2.5 mg, Oral, Daily  •  LORazepam (ATIVAN) tab 1 mg, 1 mg, Oral, Q6H PRN  •  Amantadine HCl (SYMMETREL) syrup 50 mg, 50 mg, Oral, Q12H  •  ondanse distress  Lung course bs  CV reg  abd soft, NT, G tube C/D/I  Ext trac edema      LABS:      Lab Results  Component Value Date   WBC 16.7 10/07/2017   HGB 9.7 10/07/2017   HCT 29.2 10/07/2017    10/07/2017   CREATSERUM 1.21 10/07/2017   BUN 14 10/07

## 2017-10-07 NOTE — PROGRESS NOTES
Spoke to patients spouse to update on patient status and plan of care. Patient spouse would like to speak to Doctor regarding DNR. Patient spouse states she will be up at the hospital tomorrow around 9-10 am to speak to the Physician.   Dr. Shahzad River notified

## 2017-10-07 NOTE — PLAN OF CARE
Problem: Diabetes/Glucose Control  Goal: Glucose maintained within prescribed range  INTERVENTIONS:  - Monitor Blood Glucose as ordered  - Assess for signs and symptoms of hyperglycemia and hypoglycemia  - Administer ordered medications to maintain glucose trach and on ventilator. Tolerating current vent settings. Moderate amount of yellow thick, secretion noted orally. Small amount white, thin secretions orally. Lung sounds diminished bilaterally.  SpO2: 100%    Problem: GASTROINTESTINAL - ADULT  Goal: Maint locked. HOB > 30. Frequent rounding done. Will continue to monitor pt.

## 2017-10-07 NOTE — PROGRESS NOTES
INFECTIOUS DISEASE PROGRESS NOTE    [place] Jordan Pawel Patient Status:  Inpatient    1950 MRN [id number]   Location [facility] 2W/SW - 84 (!) 36 100 % -   10/06/17 2000 137/69 (!) 97.3 °F (36.3 °C) Temporal 79 14 100 % -   10/06/17 1900 144/74 - - 82 (!) 31 100 % -   10/06/17 1800 131/74 - - 79 25 100 % -   10/06/17 1700 124/62 - - 79 25 100 % -   10/06/17 1600 116/53 98.7 °F (37.1 °C) Value Date   WBC 16.7 10/07/2017   HGB 9.7 10/07/2017   HCT 29.2 10/07/2017    10/07/2017   CREATSERUM 1.21 10/07/2017   BUN 14 10/07/2017    10/07/2017   K 3.6 10/07/2017    10/07/2017   CO2 19 10/07/2017    10/07/2017   CA 7.9 1 routine CBC progressive decline over the past few lab checks as well as stool guaiac positive. On admission afebrile with T-max since admission of 99.5 with WBC of 17.1 on admission. Urine culture negative.   KUB with air is seen throughout slightly promi

## 2017-10-07 NOTE — PROGRESS NOTES
Patient had 1 emesis during shift. Dr. Lis Russ present to witness. Per Dr. Ananya Lamb patients lungs sound clear post emesis. Received verbal order to hold all Gtube feedings at this time and change Gtube K to Iv.   Spoke to Adonay Post, Pharmacist states that

## 2017-10-07 NOTE — PLAN OF CARE
Problem: Patient Centered Care  Goal: Patient preferences are identified and integrated in the patient's plan of care  Interventions:  - What would you like us to know as we care for you?  - Provide timely, complete, and accurate information to patient/fam Progressing  Chronically vented. FiO2 stable at 40%.     Problem: SAFETY ADULT - FALL  Goal: Free from fall injury  INTERVENTIONS:  - Assess pt frequently for physical needs  - Identify cognitive and physical deficits and behaviors that affect risk of fall

## 2017-10-08 NOTE — PROGRESS NOTES
[facility]    Progress Note    Francetta Parcel Patient Status:  Inpatient    1950 MRN [id number]   Location [facility] 2W/SW Attending Lonnie Cintron MD   Hosp Day # 7 PCP Baker Dance, MD     Subjective:     Michael Rodriguez 10/08/2017   TP 6.7 10/08/2017   AST 28 10/08/2017   ALT 38 10/08/2017   PTT 31.0 10/01/2017   INR 1.2 10/01/2017   TSH 3.52 10/01/2017   LIP 60 (H) 10/07/2017   MG 2.0 10/03/2017   PHOS 2.5 10/03/2017   TROP 0.12 (HH) 09/17/2017   B12 525 10/01/2017

## 2017-10-08 NOTE — PLAN OF CARE
Diabetes/Glucose Control    • Glucose maintained within prescribed range Not Progressing        GASTROINTESTINAL - ADULT    • Maintains or returns to baseline bowel function Not Progressing    • Maintains adequate nutritional intake (undernourished) Not Pr

## 2017-10-08 NOTE — PROGRESS NOTES
Pulmonary/Critical Care Follow Up Note    HPI:   Amy Goyal is a 74year old male with Patient presents with:  Abnormal Result (metabolic, cardiac)      PCP Yang Byrnes MD  Admission Attending Kyree Mart 15 Day #7    Non verbal 50 mg, Oral, Q12H  •  ondansetron HCl (ZOFRAN) injection 4 mg, 4 mg, Intravenous, Q6H PRN  •  dextrose 50% injection 50 mL, 50 mL, Intravenous, PRN  •  Glucose-Vitamin C (DEX-4) 4-0.006 g chewable tab 4 tablet, 4 tablet, Oral, Q15 Min PRN  •  Atropine Sulf 10/08/2017   CA 7.9 10/08/2017   ALB 1.9 10/08/2017   ALKPHO 85 10/08/2017   BILT 0.8 10/08/2017   TP 6.7 10/08/2017   AST 28 10/08/2017   ALT 38 10/08/2017       Lab Results  Component Value Date   HGB 9.2 (L) 10/08/2017   HGB 9.7 (L) 10/07/2017   HGB 9.4

## 2017-10-08 NOTE — PROGRESS NOTES
GI  PROGRESS NOTE    SUBJECTIVE: family at bedside.    OBJECTIVE:  Temp:  [96.7 °F (35.9 °C)-97.5 °F (36.4 °C)] 96.7 °F (35.9 °C)  Pulse:  [71-96] 92  Resp:  [13-39] 33  BP: ()/(55-98) 119/65  FiO2 (%):  [40 %] 40 %  Exam  Gen: No acute distress,  P metoprolol Tartrate    _______________________________________________________________    IMPRESSION: Pt is a 74 yr M with h/o CVA/sacral decubitus ulcer/ESBL UTI/DM/CKD/vent dependent, HTN presently admitted with anemia.  10/2/17 CT-abd without acute patho

## 2017-10-09 NOTE — PROGRESS NOTES
[facility]    CDI Prediction Tool Protocol (Vancomycin Initiated)    OVP (oral vancomycin prophylaxis) 125 mg PO BID is being started in this patient based on a score of 15.   This patient is currently at high risk for developing CDI due to his/h

## 2017-10-09 NOTE — PROGRESS NOTES
RESPIRATORY THERAPY MECHANICAL VENTILATION PROGRESS NOTE    Ventilator Weaning:  Patient meets criteria for weaning? no Weaning was attempted no.                10/09/17 0825   Readings   PIP Observed (cm H2O) 35 cm H2O   MAP (cm H2O) 13   PEEP Low (cm H2O

## 2017-10-09 NOTE — PROGRESS NOTES
[facility]    Progress Note    Florencia Dowd Patient Status:  Inpatient    1950 MRN [id number]   Location [facility] 2W/SW Attending Savage Steven MD   Hosp Day # 8 PCP Maximus Hubbard MD       Subjective:   Omer Dailey aorta. 3. Mild cardiomegaly. Dual chamber pacemaker. 4. Tracheostomy. 5. PICC line at about [address] junction.          Xr Chest Ap Portable  (cpt=71010)    Result Date: 10/8/2017  CONCLUSION: Bilateral lower lobe air space opacities, progressed since 10/3/17

## 2017-10-09 NOTE — DIETARY NOTE
ADULT NUTRITION RE- ASSESSMENT    Pt is at high nutrition risk. Pt does not meet malnutrition criteria. RECOMMENDATIONS TO MD:  Advance as tolerated to goal 60 ml/hr Osmolite 1.2  + Propass BID as med flush.       NUTRITION DIAGNOSIS/PROBLEM: 1)  Inad RDI's. 28 kcals/kg, 1.5 gm prot/kg.      - Vitamin and mineral supplements: multivitamin/mineral and vitamin D  PTA  - Coordination of nutrition care: team meeting and collaboration with other providers    - Discharge and transfer of nutrition care to [facility] caliber transition to suggest a mechanical obstruction. 10/8 abdomen XR: CONCLUSION: Diffuse moderate grade ileus, mildly improved since 10/7/17. 10/8 +BM. FOOD/NUTRITION RELATED HISTORY: PTA:   EN: Glucerna  1.2 70 ml/hr + FWF total 900 ml/24 hrs.  Aspen Andrade Date   HGB 8.1 (L) 10/09/2017   HGB 9.2 (L) 10/08/2017   HGB 9.7 (L) 10/07/2017          NUTRITION RELATED PHYSICAL FINDINGS:  - Body Fat/Muscle Mass: well nourished per visual exam.   Fluid Accumulation:Upper extrems non-pitting.    . Skin Integrity: break

## 2017-10-09 NOTE — PROGRESS NOTES
INFECTIOUS DISEASE PROGRESS NOTE    Scott Chau Patient Status:  Inpatient    1950 MRN [id number]   Location [facility] 2W/SW 24.7 10/09/2017    10/09/2017   CREATSERUM 1.05 10/09/2017   BUN 14 10/09/2017    10/09/2017   K 4.6 10/09/2017   K 4.6 10/09/2017    10/09/2017   CO2 18 10/09/2017    10/09/2017   CA 7.6 10/09/2017   PGLU 333 10/09/2017       Rec hemoglobin dropped to 8 on routine CBC progressive decline over the past few lab checks as well as stool guaiac positive. On admission afebrile with T-max since admission of 99.5 with WBC of 17.1 on admission. Urine culture negative.   KUB with air is see Soledad Trinidad [phone]

## 2017-10-09 NOTE — PROGRESS NOTES
[facility]    Progress Note      Assessment and Plan:   1. Severe anemia– the EGD has shown only gastritis. The hemoglobin continues to fall.     Recommendations: Follow hemoglobin, continue PPI, as per gastroenterology.     2.   Chronic v CREATSERUM 1.05 10/09/2017   BUN 14 10/09/2017    10/09/2017   K 4.6 10/09/2017   K 4.6 10/09/2017    10/09/2017   CO2 18 10/09/2017    10/09/2017   CA 7.6 10/09/2017     Chest x-ray– minimal basilar haziness unchanged.     Greater than

## 2017-10-09 NOTE — PROGRESS NOTES
Pharmacy was consulted to dose Colymycin (Colisthimethate)  for treatment of MDRO bacteremia by Dr. Marilyn Castelan. Body mass index is 22.4 kg/m².   Wt Readings from Last 6 Encounters:  10/07/17 : 134 lb 9.6 oz  09/21/17 : 129 lb 11.2 oz      Estimated Creatin

## 2017-10-10 NOTE — PROGRESS NOTES
[facility]    Progress Note    Freddie Concepcion Patient Status:  Inpatient    1950 MRN [id number]   Location [facility] 2W/SW Attending Sonya García MD   Hosp Day # 9 PCP Ric Elaine MD       Subjective:   Darius Sutton Bilateral pneumonia without significant change. 2. Atherosclerotic calcification aorta. 3. Mild cardiomegaly. Dual chamber pacemaker. 4. Tracheostomy. 5. PICC line at about [address] junction.                  Eduin Clement MD  10/10/2017

## 2017-10-10 NOTE — PROGRESS NOTES
[facility]    Progress Note      Assessment and Plan:   1. Severe anemia– the EGD has shown only gastritis. The hemoglobin fell yesterday and repeat is ordered for tomorrow.     Recommendations:  Follow hemoglobin, continue PPI, as per gas unchanged.     Greater than 35 minutes critical care time    Chelsi Burnham MD  Medical Director, [address] [facility]  Medical Director, [facility]  Pager: [phone]

## 2017-10-10 NOTE — PROGRESS NOTES
[facility]    Progress Note    Atlee How Patient Status:  Inpatient    1950 MRN [id number]   Location [facility] 2W/SW Attending Cris Cao MD   Hosp Day # 9 PCP Debbie Colon MD     Subjective:     Quang Mcmillan 10/08/2017   TP 6.7 10/08/2017   AST 28 10/08/2017   ALT 38 10/08/2017   PTT 31.0 10/01/2017   INR 1.2 10/01/2017   TSH 3.52 10/01/2017   LIP 60 (H) 10/07/2017   MG 2.0 10/03/2017   PHOS 2.5 10/03/2017   TROP 0.12 (HH) 09/17/2017   B12 525 10/01/2017

## 2017-10-10 NOTE — PLAN OF CARE
Glucose maintained within prescribed range Not Progressing    Accuchecks high, Levemir restarted & TDD increased from low to medium. Continues on q6hr monitoring. Pt on D5NS.    Maintains or returns to baseline bowel function Not Progressing    Pt had small

## 2017-10-11 NOTE — PROGRESS NOTES
[facility]    Progress Note    Atlee How Patient Status:  Inpatient    1950 MRN [id number]   Location [facility] 2W/SW Attending Cris Cao MD   Hosp Day # 10 PCP Debbie Colon MD     Subjective:     Quang Mcmillan 10/08/2017   ALT 38 10/08/2017   PTT 31.0 10/01/2017   INR 1.2 10/01/2017   TSH 3.52 10/01/2017   LIP 60 (H) 10/07/2017   MG 2.0 10/03/2017   PHOS 2.5 10/03/2017   TROP 0.12 (HH) 09/17/2017   B12 525 10/01/2017       Xr Chest Ap Portable  (cpt=71010)    Re

## 2017-10-11 NOTE — PROGRESS NOTES
[facility] dosing service    Initial Pharmacokinetic Consult for Vancomycin Dosing     Shadi Roman is a 74year old male who is being treated for bacteremia. Pharmacy has been asked to dose Vancomycin by Dr. Donta Alcantar    He has No Known Allergies. 10/01/17  1.  BLOOD CULTURE Status: Abnormal (Preliminary result)   Collection Time: 10/09/17 5:05 AM   Result Value Ref Range   Blood Culture Result Pending (A) N/A   Blood Smear Positive Blood Culture N/A   Blood Smear Gram positive cocci in clusters N/A

## 2017-10-11 NOTE — PLAN OF CARE
RESPIRATORY - ADULT    • Achieves optimal ventilation and oxygenation Not Progressing        RISK FOR INFECTION - ADULT    • Absence of fever/infection during anticipated neutropenic period Not Progressing          CARDIOVASCULAR - ADULT    • Maintains opt

## 2017-10-11 NOTE — PROGRESS NOTES
[facility] RESPIRATORY THERAPY MECHANICAL VENTILATION PROGRESS NOTE    Ventilator Weaning:  Patient meets criteria for weaning? yes Weaning was attempted yes using pressure support 10 cmH2O + PEEP 5 cmH2O. The patient tolerated fair for approx. 45 minutes.  The patie

## 2017-10-11 NOTE — PROGRESS NOTES
INFECTIOUS DISEASE PROGRESS NOTE    Sandre Presume Patient Status:  Inpatient    1950 MRN [id number]   Location [facility] 2W/SW WBC  16.4*  12.9*   HGB  9.2*  8.1*   HCT  27.6*  24.7*   PLT  158  144       Recent Labs   Lab  10/08/17   0456  10/08/17   1547  10/09/17   0505   NA  135*   --   136   K  3.1*  3.6  4.6  4.6   CL  113*   --   116*   CO2  18*   --   18*   BUN  15   -- suggest worsening pneumonia. CT of the abdomen and pelvis showed nonspecific mild gaseous distention without clear mechanical obstruction. Possible aspiration pneumonia. Follow-up chest x-rays unchanged.   Start on IV Zosyn and sputum culture on 10/4 wit

## 2017-10-11 NOTE — PROGRESS NOTES
[facility]    Progress Note    Vick Page Patient Status:  Inpatient    1950 MRN [id number]   Location [facility] 2W/SW Attending Juliana Uriostegui MD   Hosp Day # 10 PCP Bijan García MD       Subjective:   Isabel Parkinson (cpt=71010)    Result Date: 10/10/2017  CONCLUSION:  1. Bilateral pneumonia without significant change. Atherosclerotic calcification aortic or 2. Mild cardiomegaly. Dual-chamber pacemaker. 3. Tracheostomy.                  Andrzej Barnett MD  10/11/2017

## 2017-10-11 NOTE — CM/SW NOTE
RUDY following up on d/c planning for the patient. Patient remains in CCU on a vent. The plan is for him to return to [facility] and updates sent to them today. Patient will have follow-up from Abigail Bansal upon d/c as well.   Abigail Bansal will need to be not

## 2017-10-11 NOTE — PROGRESS NOTES
INFECTIOUS DISEASE PROGRESS NOTE    Koby Adams Patient Status:  Inpatient    1950 MRN [id number]   Location [facility] 2W/SW place      Laboratory Data:    Lab Results  Component Value Date   WBC 15.0 10/11/2017   HGB 8.9 10/11/2017   HCT 27.2 10/11/2017    10/11/2017   CREATSERUM 1.01 10/11/2017   BUN 25 10/11/2017    10/11/2017   K 3.7 10/11/2017    10/11/20 stool guaiac positive. On admission afebrile with T-max since admission of 99.5 with WBC of 17.1 on admission. Urine culture negative.   KUB with air is seen throughout slightly prominent large and small bowel down to the level of the rectum without bowel

## 2017-10-11 NOTE — WOUND PROGRESS NOTE
Pt laying in bed, family at bedside. Sacral/perenium wound -area excoriated r/t friction and shear. Cleansed area and applied sensicare-would also recommend alternating with venelex. Open to air. Pt tolerated well. RN updated to skin care plan.

## 2017-10-11 NOTE — PROGRESS NOTES
[facility]    Progress Note      Assessment and Plan:   1. Severe anemia– the EGD has shown only gastritis. The hemoglobin fell yesterday and repeat today shows value of 8.9 which is improved from 8.1 yesterday.     Recommendations:  Matthias Barnes pupils equal round and reactive to light and accommodation. Neck without adenopathy, thyromegaly, JVD nor bruit. Lungs diminished with bilateral robust expiratory rale to auscultation and percussion. Cardiac regular rate and rhythm no murmur.    Abdome

## 2017-10-11 NOTE — PROGRESS NOTES
[facility]    Progress Note    Atajoie Enciso Patient Status:  Inpatient    1950 MRN [id number]   Location [facility] 2W/SW Attending Rossi Tirado MD   Hosp Day # 10 PCP Shell Power MD     Subjective:     Shabnam Martinez 10/03/2017   PHOS 2.5 10/03/2017   TROP 0.12 (HH) 09/17/2017   B12 525 10/01/2017       Xr Chest Ap Portable  (cpt=71010)    Result Date: 10/10/2017  CONCLUSION:  1. Bilateral pneumonia without significant change. Atherosclerotic calcification aortic or 2.

## 2017-10-12 NOTE — PLAN OF CARE
Problem: Patient Centered Care  Goal: Patient preferences are identified and integrated in the patient's plan of care  Interventions:   - What would you like us to know as we care for you?  PT UNABLE TO ANSWER  - Provide timely, complete, and accurate infor saturation or ABGs  - Provide Smoking Cessation handout, if applicable  - Encourage broncho-pulmonary hygiene including cough, deep breathe, Incentive Spirometry  - Assess the need for suctioning and perform as needed  - Assess and instruct to report SOB o [place] fall precautions as indicated by assessment.  - Educate pt/family on patient safety including physical limitations  - Instruct pt to call for assistance with activity based on assessment  - Modify environment to reduce risk of injury  - Provide a

## 2017-10-12 NOTE — CM/SW NOTE
CTL update regarding progression of care and discharge plan. Per ID, patient remains on IV Tigecycline, IV Colistin and IV Vancomycin. Palliative Care Consult completed 10/12.     Family meeting set up with Palliative Care and Dr. Adilia Davis Friday 10/13

## 2017-10-12 NOTE — RESTORATIVE THERAPY
RESTORATIVE CARE TREATMENT NOTE    Presenting Problem             Precautions          Weight Bearing Restriction                      SUNDAY MONDAY TUESDAY WEDNESDAY THURSDAY FRIDAY SATURDAY   TRANSFERS          Bed <-> Chair          Sit <-> Stand

## 2017-10-12 NOTE — PROGRESS NOTES
INFECTIOUS DISEASE PROGRESS NOTE    Rosa Patricia Patient Status:  Inpatient    1950 MRN [id number]   Location [facility] 2W/SW Results  Component Value Date   WBC 14.7 10/12/2017   HGB 9.2 10/12/2017   HCT 28.6 10/12/2017    10/12/2017   CREATSERUM 1.01 10/12/2017   BUN 26 10/12/2017    10/12/2017   K 4.0 10/12/2017   K 4.0 10/12/2017    10/12/2017   CO2 16 10/1 since admission of 99.5 with WBC of 17.1 on admission. Urine culture negative. KUB with air is seen throughout slightly prominent large and small bowel down to the level of the rectum without bowel obstruction suggestive of mild ileus.   Chest x-ray sligh

## 2017-10-12 NOTE — PLAN OF CARE
Problem: Diabetes/Glucose Control  Goal: Glucose maintained within prescribed range  INTERVENTIONS:  - Monitor Blood Glucose as ordered  - Assess for signs and symptoms of hyperglycemia and hypoglycemia  - Administer ordered medications to maintain glucose applicable  - Encourage broncho-pulmonary hygiene including cough, deep breathe, Incentive Spirometry  - Assess the need for suctioning and perform as needed  - Assess and instruct to report SOB or any respiratory difficulty  - Respiratory Therapy support platelets)  INTERVENTIONS:  - Avoid intramuscular injections, enemas and rectal medication administration  - Ensure safe mobilization of patient  - Hold pressure on venipuncture sites to achieve adequate hemostasis  - Assess for signs and symptoms of inter

## 2017-10-12 NOTE — CM/SW NOTE
SW informed that a palliative care meeting is planned for tomorrow with the family.     Axel Burt [facility]  [id number]

## 2017-10-12 NOTE — PROGRESS NOTES
[facility]    [place]    Roge Arroyo Patient Status:  Inpatient    1950 MRN [id number]   Location [facility] 2W/SW Attending Star Haro MD   Hosp Day # 11 PCP Dayanna Juares MD     Subjective:     Agnes Rahman 10/08/2017   PTT 31.0 10/01/2017   INR 1.2 10/01/2017   TSH 3.52 10/01/2017   LIP 60 (H) 10/07/2017   MG 2.0 10/03/2017   PHOS 2.5 10/03/2017   TROP 0.12 (HH) 09/17/2017   B12 525 10/01/2017       Xr Chest Ap Portable  (cpt=71010)    Result Date: 10/12/201

## 2017-10-12 NOTE — PROGRESS NOTES
[facility]    Progress Note      Assessment and Plan:   1. Severe anemia– the EGD has shown only gastritis. The hemoglobin is stable now at 9.2.   Recommendations: Follow hemoglobin, continue PPI, as per gastroenterology.     2.   Chronic state.    Objective:   Blood pressure 128/73, pulse 88, temperature (!) 97 °F (36.1 °C), temperature source Temporal, resp. rate 22, height 5' 5\" (1.651 m), weight 141 lb (64 kg), SpO2 99 %.     Physical Exam obtunded white male  HEENT examination is unrem

## 2017-10-12 NOTE — CONSULTS
[address] Patient Status:  Inpatient    1950 MRN [id number]   Location [facility] 2W/SW Attending Frances North MD   Hosp Day # 11 PCP Dina Byrd MD     Date of with no purposeful movements noted. He appears very debilitated with LUE contracted with stiffness noted on RUE. +generalized BUE edema noted. Breathing appears non-labored currently on full vent support via trach, fio2 40%, sats 98%.  No signs of respirato PLACEMENT  No date: TRACHEOSTOMY CARE KIT  No date: TUBE INSERTION    Palliative Care Social History:   Marital Status:   Children: 2 children  Living Situation Prior to Admit: Jesse PARKS  Does Patient Live Alone: no  Is Patient Confused: yes  Octaviano Stauffer Sodium (SYNTHROID) tab 100 mcg, 100 mcg, Oral, Before breakfast  •  lisinopril (PRINIVIL,ZESTRIL) tab 2.5 mg, 2.5 mg, Oral, Daily  •  LORazepam (ATIVAN) tab 1 mg, 1 mg, Oral, Q6H PRN  •  Amantadine HCl (SYMMETREL) syrup 50 mg, 50 mg, Oral, Q12H  •  ondanse 10/12/2017   CO2 16 (L) 10/12/2017    (H) 10/12/2017   CA 7.5 (L) 10/12/2017   ALB 1.9 (L) 10/08/2017   ALKPHO 85 10/08/2017   BILT 0.8 10/08/2017   TP 6.7 10/08/2017   AST 28 10/08/2017   ALT 38 10/08/2017   MG 2.0 10/03/2017   PHOS 2.5 10/03/2017 No  Patient's preference about sharing medical information: unsure, speak to family  Patient's decision making preferences: unsure, speak to family  Code status: Full code  Have advanced directives been discussed with patient or healthcare power of attorne minutes were spent on this consult, which included all of the following:direct face to face contact, history taking, physical examination, and >50% was spent counseling and coordinating care.     Discussed today's visit with Dr. Deja Kruger, Dr. Keyon Ba

## 2017-10-13 NOTE — PROGRESS NOTES
10/13/17 0920   Readings   Total RR 22   Minute Ventilation (L/min) 8.8 L/min   Expiratory Tidal Volume 400 mL   PIP Observed (cm H2O) 32 cm H2O   MAP (cm H2O) 12   Auto PEEP Observed (cm H2O) 4 cm H2O   Plateau Pressure (cm H2O) 26 cm H2O   Airway Resi

## 2017-10-13 NOTE — PROGRESS NOTES
INFECTIOUS DISEASE PROGRESS NOTE    Rosa Patricia Patient Status:  Inpatient    1950 MRN [id number]   Location [facility] 2W/SW place      Laboratory Data:    Lab Results  Component Value Date   WBC 14.6 10/13/2017   HGB 8.7 10/13/2017   HCT 27.0 10/13/2017    10/13/2017   CREATSERUM 1.17 10/13/2017   BUN 27 10/13/2017    10/13/2017   K 3.4 10/13/2017    10/13/20 dropped to 8 on routine CBC progressive decline over the past few lab checks as well as stool guaiac positive. On admission afebrile with T-max since admission of 99.5 with WBC of 17.1 on admission. Urine culture negative.   KUB with air is seen throughou Jo Razo, [address] [place] [address] [facility]  P [phone]  F [phone]

## 2017-10-13 NOTE — PROGRESS NOTES
Patient continues to have vomiting and abdominal distention. Low residuals noted. Vomit is yellow/bile and foul smelling. MDs aware. Plan for KUB and abdominal XR. TFs held at this time, D10 @ 55cc/hr per protocol.  PRN Reglan and Zofran given as appropriat

## 2017-10-13 NOTE — PROGRESS NOTES
[facility]     Progress Note        Monroe Arnett Patient Status:  Inpatient    1950 MRN [id number]   Location [facility] 2W/SW Attending Louise Stanley MD   Hosp Day # 12 PCP Galindo Waddell MD       Subjective:   Maia Kimball Intravenous Continuous   dextrose 50% injection 50 mL 50 mL Intravenous PRN   Midazolam HCl (VERSED) 5 MG/5ML injection   PRN   Metoclopramide HCl (REGLAN) injection 10 mg 10 mg Intravenous Q8H   atorvastatin (LIPITOR) tab 20 mg 20 mg Oral Nightly   bisaco cyanosis, edema  Neurologic: Does not follow commands  Skin: warm, dry      Results:     Lab Results  Component Value Date   WBC 14.6 10/13/2017   HGB 8.7 10/13/2017   HCT 27.0 10/13/2017    10/13/2017   CREATSERUM 1.17 10/13/2017   BUN 27 10/13/201 Clinic

## 2017-10-13 NOTE — PLAN OF CARE
Problem: Patient Centered Care  Goal: Patient preferences are identified and integrated in the patient's plan of care  Interventions:   - What would you like us to know as we care for you?  PT UNABLE TO ANSWER  - Provide timely, complete, and accurate infor arrhythmias  - Monitor electrolytes and administer replacement therapy as ordered   Outcome: Progressing      Problem: RESPIRATORY - ADULT  Goal: Achieves optimal ventilation and oxygenation  INTERVENTIONS:  - Assess for changes in respiratory status  - As signs and symptoms of bleeding or hemorrhage  - Monitor labs and vital signs for trends  - Administer supportive blood products/factors, fluids and medications as ordered and appropriate  - Administer supportive blood products/factors as ordered and approp

## 2017-10-13 NOTE — PROGRESS NOTES
Extra large loose bowel movement. Partial bed bath given. Patient no longer vomiting. Trach and PEG site care provided.

## 2017-10-13 NOTE — PROGRESS NOTES
Wife is now at the bedside. Updated her about patient status and how patient has had no recent activity of emesis since early this morning. Informed her of the large loose stool patient had. Will continue to monitor patient.

## 2017-10-13 NOTE — CONSULTS
[facility]  Palliative Care Follow Up    Ritta Bumpers Patient Status:  Inpatient    1950 MRN [id number]   Location [facility] 2W/SW Attending Kami Lynch MD   Hosp Day # 12 PCP Mary Ortiz MD     Date of [facility] reiterated that DNR does not mean do not treat with family. He will remain full code and family not ready to set further limits at this time.  I discussed the differences in levels of care with palliative care with ongoing supportive care vs option for hos tab 20 mg, 20 mg, Oral, Nightly  •  bisacodyl (DULCOLAX) rectal suppository 10 mg, 10 mg, Rectal, Daily PRN  •  carvedilol (COREG) tab 3.125 mg, 3.125 mg, Oral, BID with meals  •  Terazosin HCl (HYTRIN) cap 5 mg, 5 mg, Oral, Nightly  •  Levothyroxine Sodiu Value Date   CREATSERUM 1.17 10/13/2017   BUN 27 (H) 10/13/2017    10/13/2017   K 3.4 10/13/2017    (H) 10/13/2017   CO2 18 (L) 10/13/2017    (H) 10/13/2017   CA 7.5 (L) 10/13/2017   ALB 1.9 (L) 10/08/2017   ALKPHO 85 10/08/2017   BILT 0 Performance Scale : 30%    Palliative Care Goals of Care:  Discussed with Family: yes  Patient's preference about sharing medical information: unsure, speak to son and wife  Patient's decision making preferences: unsure, speak to son and wife  Code status: failure-vent dependent, dysphagia with g-tube, sacral decubitus ulcer, malnutrition, BMI 23, unintentional wt loss, Albumin 1.9, CAD, CKD, frequent rehospitalizations=palliative care appropriate, likely hospice appropriate    Emotion support provided to pa

## 2017-10-13 NOTE — DIETARY NOTE
ADULT NUTRITION RE- ASSESSMENT    Pt is at high nutrition risk. Pt does not meet malnutrition criteria. RECOMMENDATIONS TO MD:  Tf held d/t emesis again. Last abd xray 10/8 consider repeat. Replacing low Mg++ and k+.  If cannot resume TF may need cons Palliative care on case. NUTRITION INTERVENTION:   Enteral Nutrition: . Tf currently on hold.   Osmolite 1.2 formula @ Optimal goal rate of 60ml/hr + propass BID will provide: 1650 kcals, 85 gm protein, ~1170 ml H20, >100% RDI's. 28 kcals/kg, 1.5 gm pro 62.7 kg (138 lb 3.2 oz)   10/02/17 0600 60.5 kg (133 lb 6.4 oz)   10/01/17 2100 60 kg (132 lb 5.8 oz)   10/01/17 1319 61.8 kg (136 lb 3.2 oz)   Wt Readings from Last 6 Encounters:  10/13/17 : 65.9 kg (145 lb 3.2 oz)  09/21/17 : 58.8 kg (129 lb 11.2 oz)  09 QID     PRN meds: dextrose, balsam peru-castor oil, dextrose 50%, Midazolam HCl, bisacodyl, LORazepam, ondansetron HCl, Glucose-Vitamin C, Atropine Sulfate, nitroGLYCERIN, ipratropium-albuterol, PEG 3350, magnesium hydroxide, FLEET ENEMA, Metoclopramide HC

## 2017-10-14 NOTE — PLAN OF CARE
RESPIRATORY - ADULT    • Achieves optimal ventilation and oxygenation Not Progressing          CARDIOVASCULAR - ADULT    • Maintains optimal cardiac output and hemodynamic stability Progressing    • Absence of cardiac arrhythmias or at baseline Progressing RN comforted wife and addressed any questions regarding the patient's care.

## 2017-10-14 NOTE — PROGRESS NOTES
[facility]     Progress Note        Basil Pineda Patient Status:  Inpatient    1950 MRN [id number]   Location [facility] 2W/SW Attending Iza Garduno MD   Hosp Day # 15 PCP Manan Clark MD       Subjective:   Lydia Mahmood infusion  Intravenous Continuous   dextrose 50% injection 50 mL 50 mL Intravenous PRN   Midazolam HCl (VERSED) 5 MG/5ML injection   PRN   Metoclopramide HCl (REGLAN) injection 10 mg 10 mg Intravenous Q8H   atorvastatin (LIPITOR) tab 20 mg 20 mg Oral Nightl tender  Extremities: no clubbing, cyanosis, edema  Neurologic: Does not follow commands  Skin: warm, dry      Results:       Lab Results  Component Value Date   WBC 12.7 10/14/2017   HGB 8.3 10/14/2017   HCT 26.0 10/14/2017    10/14/2017   MARISELU hygiene. -Monitor hemoglobin at this time.  -Wound care  -Diabetes management with insulin  -Abdomen less distended with no significant emesis noted. Large BM yesterday. We will attempt to resume tube feedings.   -DVT prophylaxis: Heparin  -Had family me

## 2017-10-14 NOTE — PROGRESS NOTES
Obtained order and supplies for patient to have First Step Overlay air mattress. Transferred patient safely to new bed. Patient resting at this time, respirations unlabored and easy, facial expression relaxed.

## 2017-10-14 NOTE — PROGRESS NOTES
INFECTIOUS DISEASE PROGRESS NOTE    Ember Marlene Patient Status:  Inpatient    1950 MRN [id number]   Location [facility] 2W/SW c/d/i  Musculoskeletal: No edema noted  Integument: No lesions. No erythema.    wiggins in place      Laboratory Data:    Lab Results  Component Value Date   WBC 12.7 10/14/2017   HGB 8.3 10/14/2017   HCT 26.0 10/14/2017    10/14/2017   CREATSERUM 1.16 ESBL in urine.  Recently admitted September 2017 with +BCx with Staph hominis on surveillance BCx prior to PPM battery change with likely PICC source with JÚNIOR negative s/p PICC removal and PPM change.     Now admitted on 10/1 with acute anemia and hemoglobi fever curve, WBC  - reviewed labs, micro, imaging report  - avoid further central lines unless absolutely necessary  - would continue [address] discussions  - discussed with RN, primary      MD Austen Hooks [facility]  P [phone]

## 2017-10-14 NOTE — PROGRESS NOTES
On assessment of PEG tube, this RN was able to aspirate > 400 cc of air. Patient visibly more relaxed after air aspiration. RN now able to assess gastric contents, scant amount of clear/yellow bile visualized, no odor noted.  Patient abdomen visibly less di

## 2017-10-14 NOTE — PROGRESS NOTES
RESPIRATORY THERAPY MECHANICAL VENTILATION PROGRESS NOTE    Ventilator Weaning:  Patient meets criteria for weaning? no Weaning was attempted no.            10/14/17 1248   Readings   Total RR 20   Minute Ventilation (L/min) 8.8 L/min   Expiratory Tidal Vo

## 2017-10-14 NOTE — PLAN OF CARE
Problem: Patient Centered Care  Goal: Patient preferences are identified and integrated in the patient's plan of care  Interventions:   - What would you like us to know as we care for you?  PT UNABLE TO ANSWER  - Provide timely, complete, and accurate infor ADULT  Goal: Achieves optimal ventilation and oxygenation  INTERVENTIONS:  - Assess for changes in respiratory status  - Assess for changes in mentation and behavior  - Position to facilitate oxygenation and minimize respiratory effort  - Oxygen supplement period  INTERVENTIONS  - Monitor WBC  - Administer growth factors as ordered  - Implement neutropenic guidelines   Outcome: Not Progressing  Patient remains on ISOLATION precautions     Problem: SAFETY ADULT - FALL  Goal: Free from fall injury  INTERVENTIO

## 2017-10-14 NOTE — PROGRESS NOTES
RESPIRATORY THERAPY MECHANICAL VENTILATION PROGRESS NOTE     No weaning at this time.      10/12/17 0830   Readings   Total RR 20   Minute Ventilation (L/min) 9.6 L/min   Expiratory Tidal Volume 470 mL   PIP Observed (cm H2O) 36 cm H2O   MAP (cm H2O) 12   A

## 2017-10-15 NOTE — PROGRESS NOTES
[facility]    Progress Note    Honorio Sommer Patient Status:  Inpatient    1950 MRN [id number]   Location [facility] 2W/SW Attending Federico Mcnamara MD   Hosp Day # 15 PCP Pati Garcia MD     Subjective:     Madelaine Amezquita 10/14/2017   TP 6.3 10/14/2017   AST 25 10/14/2017   ALT 34 10/14/2017   PTT 31.0 10/01/2017   INR 1.2 10/01/2017   TSH 3.52 10/01/2017   LIP 60 (H) 10/07/2017   MG 1.6 (L) 10/14/2017   PHOS 3.3 10/13/2017   TROP 0.12 (HH) 09/17/2017   B12 525 10/01/2017

## 2017-10-15 NOTE — PROGRESS NOTES
[facility]    Progress Note    Hussain Parcel Patient Status:  Inpatient    1950 MRN [id number]   Location [facility] 2W/SW Attending Twila Hernandez MD   Hosp Day # 15 PCP Libertad Brunson MD     Subjective:     Leslie Howe ALT 34 10/14/2017   PTT 31.0 10/01/2017   INR 1.2 10/01/2017   TSH 3.52 10/01/2017   LIP 60 (H) 10/07/2017   MG 1.6 (L) 10/14/2017   PHOS 3.3 10/13/2017   TROP 0.12 (HH) 09/17/2017   B12 525 10/01/2017       Xr Abdomen (1 View) (cpt=74000)    Result Date

## 2017-10-16 NOTE — PROGRESS NOTES
[facility]    Progress Note      Assessment and Plan:   1. Severe anemia– the EGD has shown only gastritis. The hemoglobin is stable now at 9.3.   Recommendations: Follow hemoglobin, continue PPI, as per gastroenterology.   May be able to isolation. 9. Intermittent emesis– has not recurred recently.     Subjective:   Mak Hernandez is a(n) 74year old male who is without change clinically in persistent vegetative state. Objective:   Blood pressure 97/63, pulse 90, temperature 97.

## 2017-10-16 NOTE — PROGRESS NOTES
[facility] Pharmacy Note: Route Optimization for Pantoprazole (PROTONIX)    Patient is currently on Pantoprazole (PROTONIX) 40 mg IV every 12 hours.    The patient meets the criteria to convert to the oral equivalent as established by the IV to Oral conversion pro

## 2017-10-16 NOTE — PROGRESS NOTES
RESPIRATORY THERAPY MECHANICAL VENTILATION PROGRESS NOTE    Ventilator Weaning:  Patient meets criteria for weaning? no Weaning was attempted no   Current Ventilator Data:        Therapist recommendations:   RCP recommends     10/16/17 0900   Readings   To

## 2017-10-16 NOTE — CONSULTS
[facility]  Palliative Care Follow Up    Mak Hernandez Patient Status:  Inpatient    1950 MRN [id number]   Location [facility] 2W/SW Attending Krystal Schwarz MD   Hosp Day # 15 PCP Walter Wooten MD     Date of [facility] 97.5 mg, Intravenous, Q12H  •  Insulin Regular Human (NOVOLIN R) 100 UNIT/ML injection 1-7 Units, 1-7 Units, Subcutaneous, 4 times per day  •  tigecycline (TYGACIL) 100 mg in sodium chloride 0.9 % 100 mL IVPB, 100 mg, Intravenous, Q12H  •  dextrose 5 % and Nebulization, QID    No current outpatient prescriptions on file.     Hematology:    Lab Results  Component Value Date   WBC 11.2 (H) 10/16/2017   HGB 9.3 (L) 10/16/2017   HCT 29.6 (L) 10/16/2017    10/16/2017       Coags:    Lab Results  Component V to son and wife  Patient's decision making preferences: unsure, speak to son and wife  Code status: FULL CODE; no limits set at this time  Have advanced directives been discussed with patient or surrogate:  Yes  Advance Directive: None     Healthcare Agent Yes    Palliative Care Follow Up:    A total of 25 minutes were spent on this consult, which included all of the following:direct face to face contact, history taking, physical examination, and >50% was spent counseling and coordinating care.     Discussed

## 2017-10-16 NOTE — CM/SW NOTE
RUDY following up on d/c planning for the patient who is stable for d/c. Message left for admissions at [facility] to arrange transfer back. 2pm: [facility] will accept the patient back today,  RN to call [phone] for report.   [address] [facility]

## 2017-10-17 NOTE — ED NOTES
Patient had one small yellow episode of emesis. Patient suctioned, dressing changed. Dr. Mansfield Port aware, no changes in treatment at this time.

## 2017-10-17 NOTE — PROGRESS NOTES
INFECTIOUS DISEASE PROGRESS NOTE    [facility] Patient Status:  Inpatient    1950 MRN [id number]   Location [facility] 2W/SW 23*  25*   --   33*  38*   ALT  34  35   --   40   --    AST  25  26   --   40   --          Microbiologic Data:   Micro data reviewed     Imaging:    Imaging results reviewed     Impression:  Patient Active Problem List:     Acute pulmonary edema (RUSTca 75.) Zosyn and sputum culture on 10/4 with positive dentia and Pseudomonas.   Changed to meropenem on 10/6.     # Acute MDR ACEB bacteremia on 10/6/17 1/2 sets with repeat 10/8 and 10/9 pending - suspect RUE central catheter source s/p removal 10/9 - tip cx GPC

## 2017-10-17 NOTE — ED NOTES
Pt to ED responding to painful stimuli with c/o per EMS of tachypnea while being transported back to [facility]- pt was discharged from this CCU- while en route medics turned around to this ED after pt started having difficulty breathing - pt is trached- RT at bed

## 2017-10-17 NOTE — ED INITIAL ASSESSMENT (HPI)
Discharged from [address] [place] [address] today, on way back to NH when paramedics state that patient began to have increased respirations.

## 2017-10-17 NOTE — PROGRESS NOTES
[facility]    Progress Note    Amy Goyal Patient Status:  Inpatient    1950 MRN [id number]   Location [facility] 2W/SW Attending Felicita Oviedo MD   Hosp Day # 15 PCP Yang Byrnes MD     Subjective:     Rosa Don 1.2 10/01/2017   TSH 3.52 10/01/2017   LIP 60 (H) 10/07/2017   MG 2.1 10/16/2017   PHOS 3.3 10/13/2017   TROP 0.12 (HH) 09/17/2017   B12 525 10/01/2017       Xr Chest Ap Portable  (cpt=71010)    Result Date: 10/16/2017  CONCLUSION:  1.  Unchanged chest with

## 2017-10-17 NOTE — ED NOTES
Gave telephone report to [facility]. Dr. Jessica Shane aware patient's antibiotics due at 2200. Per MD, okay to delay antibiotics for transport.

## 2017-10-17 NOTE — ED NOTES
Pt is tolerating ventilator, vital signs is improving, pt is more relax and stable, no signs of distress noted.

## 2017-10-17 NOTE — ED PROVIDER NOTES
Patient Seen in: [facility] AND [facility] Emergency Department    History   Patient presents with:  Dyspnea JULIETA SOB (respiratory)    Stated Complaint: increased respirations    HPI    The patient is a 22-year-old male with a past history of chronic respiratory otherwise stated in HPI.     Physical Exam   ED Triage Vitals  BP: (!) 74/46 [10/16/17 1936]  Pulse: 89 [10/16/17 1936]  Resp: (!) 44 [10/16/17 1936]  Temp: 98.4 °F (36.9 °C) [10/16/17 2009]  Temp src: Rectal [10/16/17 2009]  SpO2: 100 % [10/16/17 1936]  O2 created for panel order CBC WITH DIFFERENTIAL WITH PLATELET.   Procedure                               Abnormality         Status                     ---------                               -----------         ------                     CBC W/ DIFFERENTIAL[ diagnosis)    Disposition:  Transfer to another facility    Follow-up:  No follow-up provider specified.     Medications Prescribed:  Discharge Medication List as of 10/16/2017 10:18 PM

## 2018-02-05 NOTE — DISCHARGE SUMMARY
[facility] AND [facility]  Discharge Summary    Koby Adams Patient Status:  Inpatient    1950 MRN [id number]   [facility] 2W/SW Attending No att. providers found   Hosp Day # 15 PCP Dina Byrd MD     Date of Admission: 10/1/201 Other Type of [address] Not Defined    Discharge Condition: Serious    Discharge Medications: Discharge Medication List as of 10/16/2017  6:09 PM    START taking these medications    sodium chloride 0.9 % SOLN 100 mL with Colistimethate Sodium 1 Historical    Levothyroxine Sodium 100 MCG Oral Tab  Take 100 mcg by mouth before breakfast., Historical    aspirin 81 MG Oral Tab  Take 81 mg by mouth daily. , Historical    famoTIDine 20 MG Oral Tab  Take 20 mg by mouth 2 (two) times daily. , Historical

## 2018-03-12 NOTE — PLAN OF CARE
Problem: Diabetes/Glucose Control  Goal: Glucose maintained within prescribed range  INTERVENTIONS:  - Monitor Blood Glucose as ordered  - Assess for signs and symptoms of hyperglycemia and hypoglycemia  - Administer ordered medications to maintain glucose replacement therapy as ordered   Outcome: Progressing      Problem: RESPIRATORY - ADULT  Goal: Achieves optimal ventilation and oxygenation  INTERVENTIONS:  - Assess for changes in respiratory status  - Assess for changes in mentation and behavior  - Posit hold @ midnight for EGD this AM.     Problem: HEMATOLOGIC - ADULT  Goal: Maintains hematologic stability  INTERVENTIONS  - Assess for signs and symptoms of bleeding or hemorrhage  - Monitor labs and vital signs for trends  - Administer supportive blood pro Marco Saez)

## 2021-07-10 NOTE — PLAN OF CARE
Problem: Diabetes/Glucose Control  Goal: Glucose maintained within prescribed range  INTERVENTIONS:  - Monitor Blood Glucose as ordered  - Assess for signs and symptoms of hyperglycemia and hypoglycemia  - Administer ordered medications to maintain glucose status  INTERVENTIONS  - Assess for and report changes in neurological status  - Initiate measures to prevent increased intracranial pressure  - Maintain blood pressure and fluid volume within ordered parameters to optimize cerebral perfusion and minimize Oxygen
